# Patient Record
Sex: FEMALE | Race: WHITE | Employment: FULL TIME | URBAN - METROPOLITAN AREA
[De-identification: names, ages, dates, MRNs, and addresses within clinical notes are randomized per-mention and may not be internally consistent; named-entity substitution may affect disease eponyms.]

---

## 2017-01-04 DIAGNOSIS — R00.2 HEART PALPITATIONS: ICD-10-CM

## 2017-01-04 RX ORDER — LISINOPRIL 5 MG/1
TABLET ORAL
Qty: 30 TABLET | Refills: 10 | Status: SHIPPED | OUTPATIENT
Start: 2017-01-04 | End: 2017-09-11 | Stop reason: ALTCHOICE

## 2017-01-13 ENCOUNTER — TELEPHONE (OUTPATIENT)
Dept: FAMILY MEDICINE CLINIC | Age: 22
End: 2017-01-13

## 2017-01-13 ENCOUNTER — OFFICE VISIT (OUTPATIENT)
Dept: FAMILY MEDICINE CLINIC | Age: 22
End: 2017-01-13

## 2017-01-13 VITALS
BODY MASS INDEX: 19.25 KG/M2 | WEIGHT: 119.8 LBS | SYSTOLIC BLOOD PRESSURE: 136 MMHG | DIASTOLIC BLOOD PRESSURE: 82 MMHG | HEART RATE: 102 BPM | HEIGHT: 66 IN | OXYGEN SATURATION: 100 %

## 2017-01-13 DIAGNOSIS — I47.1 PAROXYSMAL SUPRAVENTRICULAR TACHYCARDIA (HCC): Primary | ICD-10-CM

## 2017-01-13 DIAGNOSIS — I47.1 SVT (SUPRAVENTRICULAR TACHYCARDIA) (HCC): Primary | ICD-10-CM

## 2017-01-13 LAB
A/G RATIO: 1.6 (ref 1.1–2.2)
ALBUMIN SERPL-MCNC: 4.6 G/DL (ref 3.4–5)
ALP BLD-CCNC: 59 U/L (ref 40–129)
ALT SERPL-CCNC: 13 U/L (ref 10–40)
ANION GAP SERPL CALCULATED.3IONS-SCNC: 11 MMOL/L (ref 3–16)
AST SERPL-CCNC: 18 U/L (ref 15–37)
BILIRUB SERPL-MCNC: 0.6 MG/DL (ref 0–1)
BUN BLDV-MCNC: 13 MG/DL (ref 7–20)
CALCIUM SERPL-MCNC: 9.6 MG/DL (ref 8.3–10.6)
CHLORIDE BLD-SCNC: 101 MMOL/L (ref 99–110)
CO2: 27 MMOL/L (ref 21–32)
CREAT SERPL-MCNC: 0.5 MG/DL (ref 0.6–1.1)
GFR AFRICAN AMERICAN: >60
GFR NON-AFRICAN AMERICAN: >60
GLOBULIN: 2.8 G/DL
GLUCOSE BLD-MCNC: 85 MG/DL (ref 70–99)
HCT VFR BLD CALC: 42.1 % (ref 36–48)
HEMOGLOBIN: 14.1 G/DL (ref 12–16)
MCH RBC QN AUTO: 30.9 PG (ref 26–34)
MCHC RBC AUTO-ENTMCNC: 33.4 G/DL (ref 31–36)
MCV RBC AUTO: 92.3 FL (ref 80–100)
PDW BLD-RTO: 12.8 % (ref 12.4–15.4)
PLATELET # BLD: 198 K/UL (ref 135–450)
PMV BLD AUTO: 9.9 FL (ref 5–10.5)
POTASSIUM SERPL-SCNC: 4.2 MMOL/L (ref 3.5–5.1)
RBC # BLD: 4.56 M/UL (ref 4–5.2)
SODIUM BLD-SCNC: 139 MMOL/L (ref 136–145)
TOTAL PROTEIN: 7.4 G/DL (ref 6.4–8.2)
TSH SERPL DL<=0.05 MIU/L-ACNC: 0.86 UIU/ML (ref 0.27–4.2)
WBC # BLD: 4.1 K/UL (ref 4–11)

## 2017-01-13 PROCEDURE — 36415 COLL VENOUS BLD VENIPUNCTURE: CPT | Performed by: FAMILY MEDICINE

## 2017-01-13 PROCEDURE — 99214 OFFICE O/P EST MOD 30 MIN: CPT | Performed by: FAMILY MEDICINE

## 2017-01-13 ASSESSMENT — ENCOUNTER SYMPTOMS
GASTROINTESTINAL NEGATIVE: 1
RESPIRATORY NEGATIVE: 1

## 2017-05-18 ENCOUNTER — OFFICE VISIT (OUTPATIENT)
Dept: FAMILY MEDICINE CLINIC | Age: 22
End: 2017-05-18

## 2017-05-18 VITALS
DIASTOLIC BLOOD PRESSURE: 70 MMHG | HEART RATE: 98 BPM | WEIGHT: 122 LBS | HEIGHT: 66 IN | OXYGEN SATURATION: 100 % | BODY MASS INDEX: 19.61 KG/M2 | SYSTOLIC BLOOD PRESSURE: 124 MMHG

## 2017-05-18 DIAGNOSIS — F41.9 ANXIETY: ICD-10-CM

## 2017-05-18 PROCEDURE — 99213 OFFICE O/P EST LOW 20 MIN: CPT | Performed by: FAMILY MEDICINE

## 2017-05-18 RX ORDER — LORAZEPAM 0.5 MG/1
0.5 TABLET ORAL 2 TIMES DAILY PRN
Qty: 60 TABLET | Refills: 2 | Status: SHIPPED | OUTPATIENT
Start: 2017-05-18 | End: 2017-12-09 | Stop reason: SDUPTHER

## 2017-05-18 ASSESSMENT — ENCOUNTER SYMPTOMS: GASTROINTESTINAL NEGATIVE: 1

## 2017-06-05 ENCOUNTER — OFFICE VISIT (OUTPATIENT)
Dept: FAMILY MEDICINE CLINIC | Age: 22
End: 2017-06-05

## 2017-06-05 VITALS
SYSTOLIC BLOOD PRESSURE: 126 MMHG | WEIGHT: 121.6 LBS | DIASTOLIC BLOOD PRESSURE: 68 MMHG | BODY MASS INDEX: 19.54 KG/M2 | HEIGHT: 66 IN

## 2017-06-05 DIAGNOSIS — R42 DIZZINESS: ICD-10-CM

## 2017-06-05 DIAGNOSIS — R42 VERTIGO: Primary | ICD-10-CM

## 2017-06-05 PROCEDURE — 99213 OFFICE O/P EST LOW 20 MIN: CPT | Performed by: FAMILY MEDICINE

## 2017-06-05 RX ORDER — MECLIZINE HYDROCHLORIDE 25 MG/1
25 TABLET ORAL 3 TIMES DAILY PRN
Qty: 30 TABLET | Refills: 0 | Status: SHIPPED | OUTPATIENT
Start: 2017-06-05 | End: 2017-06-15

## 2017-06-05 ASSESSMENT — ENCOUNTER SYMPTOMS
GASTROINTESTINAL NEGATIVE: 1
RESPIRATORY NEGATIVE: 1

## 2017-06-14 ENCOUNTER — TELEPHONE (OUTPATIENT)
Dept: FAMILY MEDICINE CLINIC | Age: 22
End: 2017-06-14

## 2017-09-11 ENCOUNTER — OFFICE VISIT (OUTPATIENT)
Dept: FAMILY MEDICINE CLINIC | Age: 22
End: 2017-09-11

## 2017-09-11 VITALS
WEIGHT: 121 LBS | HEIGHT: 66 IN | SYSTOLIC BLOOD PRESSURE: 132 MMHG | BODY MASS INDEX: 19.44 KG/M2 | DIASTOLIC BLOOD PRESSURE: 82 MMHG

## 2017-09-11 DIAGNOSIS — R20.2 PARESTHESIA: ICD-10-CM

## 2017-09-11 DIAGNOSIS — F41.1 GAD (GENERALIZED ANXIETY DISORDER): ICD-10-CM

## 2017-09-11 DIAGNOSIS — R53.1 WEAKNESS: Primary | ICD-10-CM

## 2017-09-11 DIAGNOSIS — R26.89 BALANCE DISORDER: ICD-10-CM

## 2017-09-11 LAB
A/G RATIO: 1.3 (ref 1.1–2.2)
ALBUMIN SERPL-MCNC: 4.4 G/DL (ref 3.4–5)
ALP BLD-CCNC: 65 U/L (ref 40–129)
ALT SERPL-CCNC: 12 U/L (ref 10–40)
ANION GAP SERPL CALCULATED.3IONS-SCNC: 14 MMOL/L (ref 3–16)
AST SERPL-CCNC: 13 U/L (ref 15–37)
BILIRUB SERPL-MCNC: 0.6 MG/DL (ref 0–1)
BUN BLDV-MCNC: 12 MG/DL (ref 7–20)
CALCIUM SERPL-MCNC: 9.2 MG/DL (ref 8.3–10.6)
CHLORIDE BLD-SCNC: 102 MMOL/L (ref 99–110)
CO2: 25 MMOL/L (ref 21–32)
CREAT SERPL-MCNC: 0.6 MG/DL (ref 0.6–1.1)
GFR AFRICAN AMERICAN: >60
GFR NON-AFRICAN AMERICAN: >60
GLOBULIN: 3.3 G/DL
GLUCOSE BLD-MCNC: 71 MG/DL (ref 70–99)
HCT VFR BLD CALC: 43 % (ref 36–48)
HEMOGLOBIN: 14.5 G/DL (ref 12–16)
MCH RBC QN AUTO: 31.2 PG (ref 26–34)
MCHC RBC AUTO-ENTMCNC: 33.8 G/DL (ref 31–36)
MCV RBC AUTO: 92.5 FL (ref 80–100)
PDW BLD-RTO: 12.1 % (ref 12.4–15.4)
PLATELET # BLD: 221 K/UL (ref 135–450)
PMV BLD AUTO: 9 FL (ref 5–10.5)
POTASSIUM SERPL-SCNC: 4 MMOL/L (ref 3.5–5.1)
RBC # BLD: 4.65 M/UL (ref 4–5.2)
SEDIMENTATION RATE, ERYTHROCYTE: 18 MM/HR (ref 0–20)
SODIUM BLD-SCNC: 141 MMOL/L (ref 136–145)
TOTAL PROTEIN: 7.7 G/DL (ref 6.4–8.2)
TSH SERPL DL<=0.05 MIU/L-ACNC: 0.67 UIU/ML (ref 0.27–4.2)
VITAMIN B-12: 832 PG/ML (ref 211–911)
WBC # BLD: 4.3 K/UL (ref 4–11)

## 2017-09-11 PROCEDURE — 99214 OFFICE O/P EST MOD 30 MIN: CPT | Performed by: FAMILY MEDICINE

## 2017-09-11 PROCEDURE — 36415 COLL VENOUS BLD VENIPUNCTURE: CPT | Performed by: FAMILY MEDICINE

## 2017-09-11 RX ORDER — FLUCONAZOLE 150 MG/1
150 TABLET ORAL ONCE
Qty: 1 TABLET | Refills: 0 | Status: SHIPPED | OUTPATIENT
Start: 2017-09-11 | End: 2017-09-11

## 2017-09-11 RX ORDER — AMOXICILLIN 500 MG/1
500 CAPSULE ORAL 2 TIMES DAILY
Qty: 20 CAPSULE | Refills: 0 | Status: SHIPPED | OUTPATIENT
Start: 2017-09-11 | End: 2017-09-21

## 2017-09-11 ASSESSMENT — ENCOUNTER SYMPTOMS: RESPIRATORY NEGATIVE: 1

## 2017-10-02 ENCOUNTER — OFFICE VISIT (OUTPATIENT)
Dept: FAMILY MEDICINE CLINIC | Age: 22
End: 2017-10-02

## 2017-10-02 VITALS — WEIGHT: 122 LBS | HEIGHT: 66 IN | BODY MASS INDEX: 19.61 KG/M2

## 2017-10-02 DIAGNOSIS — Z01.419 WELL FEMALE EXAM WITH ROUTINE GYNECOLOGICAL EXAM: Primary | ICD-10-CM

## 2017-10-02 PROCEDURE — 99395 PREV VISIT EST AGE 18-39: CPT | Performed by: FAMILY MEDICINE

## 2017-10-02 ASSESSMENT — PATIENT HEALTH QUESTIONNAIRE - PHQ9
1. LITTLE INTEREST OR PLEASURE IN DOING THINGS: 0
SUM OF ALL RESPONSES TO PHQ QUESTIONS 1-9: 0
SUM OF ALL RESPONSES TO PHQ9 QUESTIONS 1 & 2: 0
2. FEELING DOWN, DEPRESSED OR HOPELESS: 0

## 2017-10-02 ASSESSMENT — ENCOUNTER SYMPTOMS: GASTROINTESTINAL NEGATIVE: 1

## 2017-10-02 NOTE — PROGRESS NOTES
Subjective:      Patient ID: Miriam Barrios is a 25 y.o. female. HPI   Pt here for a well  Gyn exam  No issues   one pap five years ago was normal     Review of Systems   Constitutional: Negative. HENT: Negative. Cardiovascular: Negative. Gastrointestinal: Negative. Genitourinary: Negative. Musculoskeletal: Negative. Skin: Negative. Neurological: Negative. Psychiatric/Behavioral: Negative. YOB: 1995    Date of Visit:  10/2/2017    No Known Allergies    Outpatient Prescriptions Marked as Taking for the 10/2/17 encounter (Office Visit) with Warren Portillo, DO   Medication Sig Dispense Refill    verapamil (CALAN SR) 120 MG extended release tablet Take 120 mg by mouth      LORazepam (ATIVAN) 0.5 MG tablet Take 1 tablet by mouth 2 times daily as needed for Anxiety 60 tablet 2       Vitals:    10/02/17 0854   Weight: 122 lb (55.3 kg)   Height: 5' 5.5\" (1.664 m)     Body mass index is 19.99 kg/(m^2). Wt Readings from Last 3 Encounters:   10/02/17 122 lb (55.3 kg)   09/11/17 121 lb (54.9 kg)   06/05/17 121 lb 9.6 oz (55.2 kg)     BP Readings from Last 3 Encounters:   09/11/17 132/82   06/05/17 126/68   05/18/17 124/70       Objective:   Physical Exam   Constitutional: She is oriented to person, place, and time. She appears well-developed and well-nourished. HENT:   Head: Normocephalic. Right Ear: External ear normal.   Left Ear: External ear normal.   Mouth/Throat: Oropharynx is clear and moist.   Neck: No thyromegaly present. Cardiovascular: Normal rate, regular rhythm and normal heart sounds. Pulmonary/Chest: Effort normal and breath sounds normal.   Abdominal: Soft. Bowel sounds are normal. She exhibits no mass. Genitourinary: Vagina normal and uterus normal.   Genitourinary Comments: bilat breast with no masses or nipple discharge    Musculoskeletal: Normal range of motion. Lymphadenopathy:     She has no cervical adenopathy.    Neurological: She is alert

## 2017-10-02 NOTE — MR AVS SNAPSHOT
After Visit Summary             John Ojeda   10/2/2017 9:00 AM   Office Visit    Description:  Female : 1995   Provider:  Jeremiah Buckley DO   Department:  [facility]              Your Follow-Up and Future Appointments         Below is a list of your follow-up and future appointments. This may not be a complete list as you may have made appointments directly with providers that we are not aware of or your providers may have made some for you. Please call your providers to confirm appointments. It is important to keep your appointments. Please bring your current insurance card, photo ID, co-pay, and all medication bottles to your appointment. If self-pay, payment is expected at the time of service. Your To-Do List     Follow-Up    Return in about 1 year (around 10/2/2018). Information from Your Visit        Department     Name Address Phone Fax    [address] [facility] [address] [facility] [address]  [address] [place] [address] [phone]      You Were Seen for:         Comments    Well female exam with routine gynecological exam   [[id number]]         Vital Signs     Height Weight Last Menstrual Period Body Mass Index Smoking Status       5' 5.5\" (1.664 m) 122 lb (55.3 kg) 2017 19.99 kg/m2 Never Smoker          Medications and Orders      Your Current Medications Are              verapamil (CALAN SR) 120 MG extended release tablet Take 120 mg by mouth    LORazepam (ATIVAN) 0.5 MG tablet Take 1 tablet by mouth 2 times daily as needed for Anxiety      Allergies           No Known Allergies      We Ordered/Performed the following           PAP SMEAR     Comments:    Patient History:    Patient's last menstrual period was 2017. OBGYN Status: Having regular periods  No past surgical history on file.       Smoking status: Never Smoker                                                              Smokeless status: Never Used Comment: encouraged to never smoke            Additional Information        Basic Information     Date Of Birth Sex Race Ethnicity Preferred Language    1995 Female White Non-/Non  English      Problem List as of 10/2/2017  Date Reviewed: 9/11/2017          None      Immunizations as of 10/2/2017     Name Date    HPV Gardasil Quadrivalent 5/9/2013, 12/10/2012, 11/9/2012    Tdap (Boostrix, Adacel) 6/9/2014      Preventive Care        Date Due    HIV screening is recommended for all people regardless of risk factors  aged 15-65 years at least once (lifetime) who have never been HIV tested. 4/25/2010    Pap Smear 4/25/2016    Yearly Flu Vaccine (1) 9/1/2017    Tetanus Combination Vaccine (2 - Td) 6/9/2024            MyChart Signup           [facility] allows you to send messages to your doctor, view your test results, renew your prescriptions, schedule appointments, view visit notes, and more. How Do I Sign Up? 1. In your Internet browser, go to [email or web address]  2. Click on the Sign Up Now link in the Sign In box. You will see the New Member Sign Up page. 3. Enter your [facility] Access Code exactly as it appears below. You will not need to use this code after youve completed the sign-up process. If you do not sign up before the expiration date, you must request a new code. [facility] Access Code: [email or web address]  Expires: 11/10/2017  9:49 AM    4. Enter your Social Security Number ([id number]) and Date of Birth (mm/dd/yyyy) as indicated and click Submit. You will be taken to the next sign-up page. 5. Create a [facility] ID. This will be your [facility] login ID and cannot be changed, so think of one that is secure and easy to remember. 6. Create a [facility] password. You can change your password at any time. 7. Enter your Password Reset Question and Answer. This can be used at a later time if you forget your password.

## 2017-12-08 ENCOUNTER — OFFICE VISIT (OUTPATIENT)
Dept: FAMILY MEDICINE CLINIC | Age: 22
End: 2017-12-08

## 2017-12-08 VITALS
WEIGHT: 128.4 LBS | SYSTOLIC BLOOD PRESSURE: 128 MMHG | BODY MASS INDEX: 20.63 KG/M2 | HEIGHT: 66 IN | DIASTOLIC BLOOD PRESSURE: 80 MMHG

## 2017-12-08 DIAGNOSIS — F41.9 ANXIETY: ICD-10-CM

## 2017-12-08 PROCEDURE — G8420 CALC BMI NORM PARAMETERS: HCPCS | Performed by: FAMILY MEDICINE

## 2017-12-08 PROCEDURE — G8427 DOCREV CUR MEDS BY ELIG CLIN: HCPCS | Performed by: FAMILY MEDICINE

## 2017-12-08 PROCEDURE — G8484 FLU IMMUNIZE NO ADMIN: HCPCS | Performed by: FAMILY MEDICINE

## 2017-12-08 PROCEDURE — 1036F TOBACCO NON-USER: CPT | Performed by: FAMILY MEDICINE

## 2017-12-08 PROCEDURE — 99213 OFFICE O/P EST LOW 20 MIN: CPT | Performed by: FAMILY MEDICINE

## 2017-12-08 RX ORDER — LORAZEPAM 0.5 MG/1
0.5 TABLET ORAL 2 TIMES DAILY PRN
Qty: 60 TABLET | Refills: 2 | Status: CANCELLED | OUTPATIENT
Start: 2017-12-08

## 2017-12-08 RX ORDER — NORGESTIMATE AND ETHINYL ESTRADIOL 0.25-0.035
KIT ORAL
Refills: 4 | COMMUNITY
Start: 2017-11-02 | End: 2018-08-31 | Stop reason: ALTCHOICE

## 2017-12-09 ENCOUNTER — TELEPHONE (OUTPATIENT)
Dept: FAMILY MEDICINE CLINIC | Age: 22
End: 2017-12-09

## 2017-12-09 DIAGNOSIS — F41.9 ANXIETY: ICD-10-CM

## 2017-12-09 RX ORDER — LORAZEPAM 0.5 MG/1
0.5 TABLET ORAL 2 TIMES DAILY PRN
Qty: 60 TABLET | Refills: 2 | Status: SHIPPED | OUTPATIENT
Start: 2017-12-09 | End: 2018-08-31 | Stop reason: SDUPTHER

## 2017-12-10 RX ORDER — IBUPROFEN 800 MG/1
800 TABLET ORAL EVERY 8 HOURS PRN
Qty: 60 TABLET | Refills: 3 | Status: SHIPPED | OUTPATIENT
Start: 2017-12-10 | End: 2019-10-29

## 2017-12-10 NOTE — PROGRESS NOTES
Subjective:      Patient ID: Berhane Buenrostro is a 25 y.o. female. Mental Health Problem   The primary symptoms do not include dysphoric mood, delusions, hallucinations, bizarre behavior, disorganized speech or negative symptoms. The current episode started more than 1 month ago. This is a chronic problem. The onset of the illness is precipitated by a stressful event and emotional stress. The degree of incapacity that she is experiencing as a consequence of her illness is mild. Sequelae of the illness include harmed interpersonal relations. Additional symptoms of the illness include insomnia, fatigue, agitation, attention impairment, flight of ideas and distractible. Additional symptoms of the illness do not include anhedonia, hypersomnia, unexpected weight change or psychomotor retardation. She does not admit to suicidal ideas. She does not have a plan to commit suicide. She does not contemplate harming herself. She has not already injured self. She does not contemplate injuring another person. Review of Systems   Constitutional: Positive for fatigue. Negative for unexpected weight change. Psychiatric/Behavioral: Positive for agitation. Negative for dysphoric mood and hallucinations. The patient has insomnia. YOB: 1995    Date of Visit:  12/8/2017    No Known Allergies    Outpatient Prescriptions Marked as Taking for the 12/8/17 encounter (Office Visit) with Lorin Waggoner,    Medication Sig Dispense Refill    MONONESSA 0.25-35 MG-MCG per tablet TK 1 T PO QD  4    verapamil (CALAN SR) 120 MG extended release tablet Take 120 mg by mouth      [DISCONTINUED] LORazepam (ATIVAN) 0.5 MG tablet Take 1 tablet by mouth 2 times daily as needed for Anxiety 60 tablet 2       Vitals:    12/08/17 1345   BP: 128/80   Weight: 128 lb 6.4 oz (58.2 kg)   Height: 5' 5.5\" (1.664 m)     Body mass index is 21.04 kg/m².      Wt Readings from Last 3 Encounters:   12/08/17 128 lb 6.4 oz (58.2 kg)

## 2018-02-12 ENCOUNTER — OFFICE VISIT (OUTPATIENT)
Dept: FAMILY MEDICINE CLINIC | Age: 23
End: 2018-02-12

## 2018-02-12 VITALS
HEIGHT: 67 IN | HEART RATE: 130 BPM | WEIGHT: 131 LBS | BODY MASS INDEX: 20.56 KG/M2 | SYSTOLIC BLOOD PRESSURE: 145 MMHG | DIASTOLIC BLOOD PRESSURE: 97 MMHG

## 2018-02-12 DIAGNOSIS — K21.9 GASTROESOPHAGEAL REFLUX DISEASE, ESOPHAGITIS PRESENCE NOT SPECIFIED: Primary | ICD-10-CM

## 2018-02-12 DIAGNOSIS — R00.0 SINUS TACHYCARDIA: ICD-10-CM

## 2018-02-12 DIAGNOSIS — K29.00 ACUTE GASTRITIS, PRESENCE OF BLEEDING UNSPECIFIED, UNSPECIFIED GASTRITIS TYPE: ICD-10-CM

## 2018-02-12 PROCEDURE — 99214 OFFICE O/P EST MOD 30 MIN: CPT | Performed by: FAMILY MEDICINE

## 2018-02-12 PROCEDURE — G8420 CALC BMI NORM PARAMETERS: HCPCS | Performed by: FAMILY MEDICINE

## 2018-02-12 PROCEDURE — G8484 FLU IMMUNIZE NO ADMIN: HCPCS | Performed by: FAMILY MEDICINE

## 2018-02-12 PROCEDURE — G8427 DOCREV CUR MEDS BY ELIG CLIN: HCPCS | Performed by: FAMILY MEDICINE

## 2018-02-12 PROCEDURE — 1036F TOBACCO NON-USER: CPT | Performed by: FAMILY MEDICINE

## 2018-02-12 RX ORDER — OMEPRAZOLE 40 MG/1
40 CAPSULE, DELAYED RELEASE ORAL DAILY
Qty: 30 CAPSULE | Refills: 3 | Status: CANCELLED | OUTPATIENT
Start: 2018-02-12

## 2018-02-12 RX ORDER — PANTOPRAZOLE SODIUM 20 MG/1
20 TABLET, DELAYED RELEASE ORAL DAILY
Qty: 30 TABLET | Refills: 3 | Status: SHIPPED | OUTPATIENT
Start: 2018-02-12 | End: 2018-08-31 | Stop reason: ALTCHOICE

## 2018-02-12 ASSESSMENT — ENCOUNTER SYMPTOMS
BELCHING: 0
FLATUS: 0
VOMITING: 0
NAUSEA: 1
DIARRHEA: 0
ABDOMINAL PAIN: 1
CONSTIPATION: 0

## 2018-02-12 NOTE — PROGRESS NOTES
Subjective:      Patient ID: Linzie Dandy is a 25 y.o. female. Abdominal Pain   This is a recurrent problem. The current episode started more than 1 year ago. The onset quality is gradual. The problem occurs intermittently. The problem has been waxing and waning. The pain is located in the epigastric region. The pain is at a severity of 5/10. The pain is moderate. The quality of the pain is aching. The abdominal pain radiates to the epigastric region. Associated symptoms include anorexia, myalgias and nausea. Pertinent negatives include no arthralgias, belching, constipation, diarrhea, dysuria, fever, flatus, frequency, hematuria, melena, vomiting or weight loss. The pain is aggravated by belching. The pain is relieved by eating. She has tried antacids for the symptoms. The treatment provided no relief. Tachycardia  She has svt  It is flaring this morning  Pulse in the 130's   She is supposed to have ablation sinus node  Seeing cardiology  Feels light headed   Started driving in here started to feel   Taking her verapamil   Review of Systems   Constitutional: Negative for fever and weight loss. Gastrointestinal: Positive for abdominal pain, anorexia and nausea. Negative for constipation, diarrhea, flatus, melena and vomiting. Genitourinary: Negative for dysuria, frequency and hematuria. Musculoskeletal: Positive for myalgias. Negative for arthralgias. YOB: 1995    Date of Visit:  2/12/2018    No Known Allergies    Outpatient Prescriptions Marked as Taking for the 2/12/18 encounter (Office Visit) with [facility], DO   Medication Sig Dispense Refill    ibuprofen (ADVIL;MOTRIN) 800 MG tablet Take 1 tablet by mouth every 8 hours as needed for Pain 60 tablet 3    LORazepam (ATIVAN) 0.5 MG tablet Take 1 tablet by mouth 2 times daily as needed for Anxiety .  60 tablet 2    MONONESSA 0.25-35 MG-MCG per tablet TK 1 T PO QD  4    verapamil (CALAN SR) 120 MG extended release tablet Take

## 2018-08-31 ENCOUNTER — OFFICE VISIT (OUTPATIENT)
Dept: FAMILY MEDICINE CLINIC | Age: 23
End: 2018-08-31

## 2018-08-31 VITALS
SYSTOLIC BLOOD PRESSURE: 122 MMHG | WEIGHT: 137 LBS | DIASTOLIC BLOOD PRESSURE: 72 MMHG | BODY MASS INDEX: 21.5 KG/M2 | HEIGHT: 67 IN

## 2018-08-31 DIAGNOSIS — F41.9 ANXIETY: ICD-10-CM

## 2018-08-31 DIAGNOSIS — N93.8 DUB (DYSFUNCTIONAL UTERINE BLEEDING): Primary | ICD-10-CM

## 2018-08-31 PROCEDURE — G8420 CALC BMI NORM PARAMETERS: HCPCS | Performed by: FAMILY MEDICINE

## 2018-08-31 PROCEDURE — G8427 DOCREV CUR MEDS BY ELIG CLIN: HCPCS | Performed by: FAMILY MEDICINE

## 2018-08-31 PROCEDURE — 99213 OFFICE O/P EST LOW 20 MIN: CPT | Performed by: FAMILY MEDICINE

## 2018-08-31 PROCEDURE — 1036F TOBACCO NON-USER: CPT | Performed by: FAMILY MEDICINE

## 2018-08-31 RX ORDER — LEVONORGESTREL / ETHINYL ESTRADIOL AND ETHINYL ESTRADIOL 150-30(84)
1 KIT ORAL DAILY
Qty: 91 TABLET | Refills: 3 | Status: SHIPPED | OUTPATIENT
Start: 2018-08-31 | End: 2019-05-07 | Stop reason: ALTCHOICE

## 2018-08-31 RX ORDER — LORAZEPAM 0.5 MG/1
0.5 TABLET ORAL 2 TIMES DAILY PRN
Qty: 60 TABLET | Refills: 2 | Status: SHIPPED | OUTPATIENT
Start: 2018-08-31 | End: 2018-09-30

## 2018-09-03 ASSESSMENT — ENCOUNTER SYMPTOMS
RESPIRATORY NEGATIVE: 1
GASTROINTESTINAL NEGATIVE: 1

## 2018-10-30 RX ORDER — AMOXICILLIN 500 MG/1
500 CAPSULE ORAL 3 TIMES DAILY
Qty: 30 CAPSULE | Refills: 0 | Status: SHIPPED | OUTPATIENT
Start: 2018-10-30 | End: 2018-11-09

## 2019-05-07 ENCOUNTER — OFFICE VISIT (OUTPATIENT)
Dept: FAMILY MEDICINE CLINIC | Age: 24
End: 2019-05-07
Payer: COMMERCIAL

## 2019-05-07 VITALS
DIASTOLIC BLOOD PRESSURE: 74 MMHG | SYSTOLIC BLOOD PRESSURE: 124 MMHG | BODY MASS INDEX: 21 KG/M2 | WEIGHT: 133.8 LBS | HEIGHT: 67 IN

## 2019-05-07 DIAGNOSIS — N93.8 DUB (DYSFUNCTIONAL UTERINE BLEEDING): ICD-10-CM

## 2019-05-07 DIAGNOSIS — F41.1 GAD (GENERALIZED ANXIETY DISORDER): Primary | ICD-10-CM

## 2019-05-07 PROCEDURE — G8427 DOCREV CUR MEDS BY ELIG CLIN: HCPCS | Performed by: FAMILY MEDICINE

## 2019-05-07 PROCEDURE — G8420 CALC BMI NORM PARAMETERS: HCPCS | Performed by: FAMILY MEDICINE

## 2019-05-07 PROCEDURE — 1036F TOBACCO NON-USER: CPT | Performed by: FAMILY MEDICINE

## 2019-05-07 PROCEDURE — 99213 OFFICE O/P EST LOW 20 MIN: CPT | Performed by: FAMILY MEDICINE

## 2019-05-07 RX ORDER — LORAZEPAM 0.5 MG/1
TABLET ORAL
Refills: 0 | COMMUNITY
Start: 2019-02-27 | End: 2019-05-07 | Stop reason: SDUPTHER

## 2019-05-07 RX ORDER — LORAZEPAM 0.5 MG/1
TABLET ORAL
Qty: 60 TABLET | Refills: 2 | Status: SHIPPED | OUTPATIENT
Start: 2019-05-07 | End: 2019-06-07

## 2019-05-07 ASSESSMENT — PATIENT HEALTH QUESTIONNAIRE - PHQ9
1. LITTLE INTEREST OR PLEASURE IN DOING THINGS: 0
2. FEELING DOWN, DEPRESSED OR HOPELESS: 0
SUM OF ALL RESPONSES TO PHQ QUESTIONS 1-9: 0
SUM OF ALL RESPONSES TO PHQ9 QUESTIONS 1 & 2: 0
SUM OF ALL RESPONSES TO PHQ QUESTIONS 1-9: 0

## 2019-05-07 ASSESSMENT — ENCOUNTER SYMPTOMS: RESPIRATORY NEGATIVE: 1

## 2019-05-07 NOTE — PROGRESS NOTES
normal. Judgment and thought content normal.   Nursing note and vitals reviewed. Assessment:     Assessment/plan;  Tino Sahni was seen today for medication check, medication refill and menstrual problem. Diagnoses and all orders for this visit:    NEENA (generalized anxiety disorder)  -     LORazepam (ATIVAN) 0.5 MG tablet; TK 1 T PO BID PRA  -     Drug Panel-PM-HI Res-UR Interp-A    DUB (dysfunctional uterine bleeding)  -     AFL - Frank Angulo MD, Obstetrics, [facility]      Controlled Substances Monitoring:     RX Monitoring 5/7/2019   Attestation The Prescription Monitoring Report for this patient was reviewed today. Chronic Pain Routine Monitoring Possible medication side effects, risk of tolerance/dependence & alternative treatments discussed. ;Potential drug abuse or diversion identified, see note documentation.;Obtaining appropriate analgesic effect of treatment. ;No signs of potential drug abuse or diversion identified: otherwise, see note documentation       Kaushik Ramires DO

## 2019-05-07 NOTE — LETTER
MEDICATION AGREEMENT     Bárbara Andrews  [birth year]      For certain conditions, multiple classes of medications may be used to help better manage your symptoms, and to improve your ability to function at home, work and in social settings. However, these medications do have risks, which will be discussed with you, including addiction and dependency. The following prescribed medications need frequent monitoring and will require you to partner and assist in your healthcare. Medication  Dose, instructions and quantity as indicated on current prescription bottle Diagnosis/Reason(s) for Taking Category   Ativan 0.5mg 1 BID    Anxiety                             Benefits and goals of Controlled Substance Medications: There are two potential goals for your treatment: (1) decreased pain and suffering (2) improved daily life functions. There are many possible treatments for your chronic condition(s), and, in addition to controlled substance medications, we will try alternatives such as physical therapy, yoga, massage, home daily exercise, meditation, relaxation techniques, injections, chiropractic manipulations, surgery, cognitive therapy, hypnosis and many medications that are not habit-forming. Use of controlled substance medications may be helpful, but they are unlikely to resolve all of your symptoms or restore all function. Risks of Controlled Substance Medications:    Opioid pain medications: These medications can lead to problems such as addiction/dependence, sedation, lightheadedness/dizziness, memory issues, falls, constipation, nausea, or vomiting. They may also impair the ability to drive or operate machinery. Additionally, these medications may lower testosterone levels, leading to loss of bone strength, stamina and sex drive.   They may cause problems with breathing, sleep apnea and reduced coughing, which are especially dangerous for patients with lung disease. Overdose or dangerous interactions with alcohol and other medications may occur, leading to death. Hyperalgesia may develop, in which patients receiving opioids for the treatment of pain may actually become more sensitive to certain painful stimuli, and in some cases, experience pain from ordinarily non-painful stimuli. Women between the ages of 14-53 who could become pregnant should carefully weigh the risks and benefits of opioids with their physicians, as these medications increase the risk of pregnancy complications, including miscarriage,  delivery and stillbirth. It is also possible for babies to be born addicted to opioids. Opioid dependence withdrawal symptoms may include; feelings of uneasiness, increased pain, irritability, belly pain, diarrhea, sweats and goose-flesh. Benzodiazepines and non-benzodiazepine sleep medications: These medications can lead to problems such as addiction/dependence, sedation, fatigue, lightheadedness, dizziness, incoordination, falls, depression, hallucinations, and impaired judgment, memory and concentration. The ability to drive and operate machinery may also be affected. Abnormal sleep-related behaviors have been reported, including sleep walking, driving, making telephone calls, eating, or having sex while not fully awake. These medications can suppress breathing and worsen sleep apnea, particularly when combined with alcohol or other sedating medications, potentially leading to death. Dependence withdrawal symptoms may include tremors, anxiety, hallucinations and seizures. Stimulants:  Common adverse effects include addiction/dependence, increased blood pressure and heart rate, decreased appetite, nausea, involuntary weight loss, insomnia, irritability, and headaches.   These risks may increase when these medications are combined with other stimulants, such as caffeine pills or energy drinks, certain weight loss · I will actively participate in any program designed to improve function, including social, physical, psychological and daily or work activities. 2. I will not use illegal or street drugs or another person's prescription. If I have an addiction problem with drugs or alcohol and my provider asks me to enter a program to address this issue, I agree to follow through. Such programs may include:  · 12-Step program and securing a sponsor  · Individual counseling   · Inpatient or outpatient treatment  · Other:_____________________________________________________________________________________________________________________________________________    If in treatment, I will request that a copy of the programs initial evaluation and treatment recommendations be sent to this provider and will not expect refills until that is received. I will also request written monthly updates be sent to this provider to verify my continuing treatment. 3. I will consent to drug screening upon my providers request to assure I am only taking the prescribed drugs, described in this MEDICATION AGREEMENT. I understand that a drug screen is a laboratory test in which a sample of my urine, blood or saliva is checked to see what drugs I have been taking. 4. I agree that I will treat the providers and staff at this office with respect at all times. I will keep all of my scheduled appointments, but if I need to cancel my appointment, I will do so a minimum of 24 hours before it is scheduled. 5. I understand that this provider may stop prescribing the medications listed if:  · I do not show any improvement in pain, or my activity has not improved. · I develop rapid tolerance or loss of improvement, as described in my treatment plan. · I develop significant side effects from the medication.   · My behavior is inconsistent with the responsibilities outlined above, which may also result in my being prevented from receiving further care from this office. · Other:____________________________________________________________________    AGREEMENT:    I have read the above and have had all of my questions answered. For chronic disease management, I know that my symptoms can be managed with many types of treatments. A chronic medication trial may be part of my treatment, but I must be an active participant in my care. Medication therapy is only one part of my symptom management plan. In some cases, there may be limited scientific evidence to support the chronic use of certain medications to improve symptoms and daily function. Furthermore, in certain circumstances, there may be scientific information that suggests that use of chronic controlled substances may actually worsen my symptoms and increase my risk of unintentional death directly related to this medication therapy. I know that if my provider feels my risk from controlled medications is greater than my benefit, I will have my controlled substance medication(s) compassionately lowered or removed altogether. I agree to a controlled substance medication trial.      I further agree to allow this office to contact my HIPAA contact on file if there are concerns about my safety and use of controlled medications. I have agreed to use the following medications above as instructed by my physician and as stated in this [facility].      Patient Signature:  ______________________  Date:5/7/2019 or _____________    Provider Signature:______________________  Date:5/7/2019 or _____________

## 2019-05-11 LAB
6-ACETYLMORPHINE: NOT DETECTED
7-AMINOCLONAZEPAM: NOT DETECTED
ALPHA-OH-ALPRAZOLAM: NOT DETECTED
ALPRAZOLAM: NOT DETECTED
AMPHETAMINE: NOT DETECTED
BARBITURATES: NOT DETECTED
BENZOYLECGONINE: NOT DETECTED
BUPRENORPHINE: NOT DETECTED
CARISOPRODOL: NOT DETECTED
CLONAZEPAM: NOT DETECTED
CODEINE: NOT DETECTED
CREATININE URINE: 168.4 MG/DL (ref 20–400)
DIAZEPAM: NOT DETECTED
DRUGS EXPECTED: NORMAL
EER PAIN MGT DRUG PANEL, HIGH RES/EMIT U: NORMAL
ETHYL GLUCURONIDE: NOT DETECTED
FENTANYL: NOT DETECTED
HYDROCODONE: NOT DETECTED
HYDROMORPHONE: NOT DETECTED
LORAZEPAM: PRESENT
MARIJUANA METABOLITE: PRESENT
MDA: NOT DETECTED
MDEA: NOT DETECTED
MDMA URINE: NOT DETECTED
MEPERIDINE: NOT DETECTED
METHADONE: NOT DETECTED
METHAMPHETAMINE: NOT DETECTED
METHYLPHENIDATE: NOT DETECTED
MIDAZOLAM: NOT DETECTED
MORPHINE: NOT DETECTED
NORBUPRENORPHINE, FREE: NOT DETECTED
NORDIAZEPAM: NOT DETECTED
NORFENTANYL: NOT DETECTED
NORHYDROCODONE, URINE: NOT DETECTED
NOROXYCODONE: NOT DETECTED
NOROXYMORPHONE, URINE: NOT DETECTED
OXAZEPAM: NOT DETECTED
OXYCODONE: NOT DETECTED
OXYMORPHONE: NOT DETECTED
PAIN MANAGEMENT DRUG PANEL: NORMAL
PAIN MANAGEMENT DRUG PANEL: NORMAL
PCP: NOT DETECTED
PHENTERMINE: NOT DETECTED
PROPOXYPHENE: NOT DETECTED
TAPENTADOL, URINE: NOT DETECTED
TAPENTADOL-O-SULFATE, URINE: NOT DETECTED
TEMAZEPAM: NOT DETECTED
TRAMADOL: NOT DETECTED
ZOLPIDEM: NOT DETECTED

## 2019-05-13 ENCOUNTER — OFFICE VISIT (OUTPATIENT)
Dept: FAMILY MEDICINE CLINIC | Age: 24
End: 2019-05-13
Payer: COMMERCIAL

## 2019-05-13 ENCOUNTER — TELEPHONE (OUTPATIENT)
Dept: FAMILY MEDICINE CLINIC | Age: 24
End: 2019-05-13

## 2019-05-13 VITALS
HEIGHT: 67 IN | SYSTOLIC BLOOD PRESSURE: 140 MMHG | DIASTOLIC BLOOD PRESSURE: 86 MMHG | BODY MASS INDEX: 20.91 KG/M2 | WEIGHT: 133.2 LBS

## 2019-05-13 DIAGNOSIS — J01.90 ACUTE BACTERIAL SINUSITIS: Primary | ICD-10-CM

## 2019-05-13 DIAGNOSIS — B96.89 ACUTE BACTERIAL SINUSITIS: Primary | ICD-10-CM

## 2019-05-13 LAB
INFLUENZA A ANTIGEN, POC: NEGATIVE
INFLUENZA B ANTIGEN, POC: NEGATIVE

## 2019-05-13 PROCEDURE — 87804 INFLUENZA ASSAY W/OPTIC: CPT | Performed by: FAMILY MEDICINE

## 2019-05-13 PROCEDURE — G8420 CALC BMI NORM PARAMETERS: HCPCS | Performed by: FAMILY MEDICINE

## 2019-05-13 PROCEDURE — 99213 OFFICE O/P EST LOW 20 MIN: CPT | Performed by: FAMILY MEDICINE

## 2019-05-13 PROCEDURE — 1036F TOBACCO NON-USER: CPT | Performed by: FAMILY MEDICINE

## 2019-05-13 PROCEDURE — G8427 DOCREV CUR MEDS BY ELIG CLIN: HCPCS | Performed by: FAMILY MEDICINE

## 2019-05-13 RX ORDER — AZITHROMYCIN 250 MG/1
250 TABLET, FILM COATED ORAL SEE ADMIN INSTRUCTIONS
Qty: 6 TABLET | Refills: 0 | Status: SHIPPED | OUTPATIENT
Start: 2019-05-13 | End: 2019-05-18

## 2019-05-13 ASSESSMENT — ENCOUNTER SYMPTOMS
HOARSE VOICE: 1
COUGH: 1
SINUS PRESSURE: 1

## 2019-05-13 NOTE — PROGRESS NOTES
Subjective:      Patient ID: Perez Fischer is a 25 y.o. female. Sinusitis   This is a new problem. The current episode started in the past 7 days. The problem has been gradually worsening since onset. There has been no fever. The pain is moderate. Associated symptoms include chills, congestion, coughing, headaches, a hoarse voice, sinus pressure and sneezing. Past treatments include oral decongestants and acetaminophen. The treatment provided no relief. Review of Systems   Constitutional: Positive for chills. HENT: Positive for congestion, hoarse voice, sinus pressure and sneezing. Respiratory: Positive for cough. Neurological: Positive for headaches. YOB: 1995    Date of Visit:  5/13/2019    Allergies   Allergen Reactions    Prochlorperazine Anxiety       Outpatient Medications Marked as Taking for the 5/13/19 encounter (Office Visit) with Jim Camacho, DO   Medication Sig Dispense Refill    LORazepam (ATIVAN) 0.5 MG tablet TK 1 T PO BID PRA 60 tablet 2    ibuprofen (ADVIL;MOTRIN) 800 MG tablet Take 1 tablet by mouth every 8 hours as needed for Pain 60 tablet 3    verapamil (CALAN SR) 120 MG extended release tablet Take 120 mg by mouth         Vitals:    05/13/19 1358   BP: (!) 140/86   Weight: 133 lb 3.2 oz (60.4 kg)   Height: 5' 7\" (1.702 m)     Body mass index is 20.86 kg/m². Wt Readings from Last 3 Encounters:   05/13/19 133 lb 3.2 oz (60.4 kg)   05/07/19 133 lb 12.8 oz (60.7 kg)   08/31/18 137 lb (62.1 kg)     BP Readings from Last 3 Encounters:   05/13/19 (!) 140/86   05/07/19 124/74   08/31/18 122/72       Objective:   Physical Exam   Constitutional: She is oriented to person, place, and time. She appears well-developed and well-nourished. No distress. HENT:   Head: Normocephalic. Right Ear: Tympanic membrane, external ear and ear canal normal.   Left Ear: Tympanic membrane, external ear and ear canal normal.   Nose: Mucosal edema present.    Mouth/Throat: Posterior oropharyngeal erythema present. Eyes: Conjunctivae are normal. Left eye discharge: pnd    Neck: No thyromegaly present. Cardiovascular: Normal rate. Pulmonary/Chest: Effort normal and breath sounds normal. No respiratory distress. She has no wheezes. She has no rales. Lymphadenopathy:     She has cervical adenopathy. Neurological: She is alert and oriented to person, place, and time. Skin: Skin is warm and dry. No rash noted. Psychiatric: She has a normal mood and affect. Her behavior is normal. Judgment and thought content normal.       Assessment:      Assessment/plan;  Chance Tubbs was seen today for congestion, otalgia and nausea. Diagnoses and all orders for this visit:    Acute bacterial sinusitis  -     POCT Influenza A/B Antigen (BD Veritor)    Other orders  -     azithromycin (ZITHROMAX) 250 MG tablet; Take 1 tablet by mouth See Admin Instructions for 5 days 500mg on day 1 followed by 250mg on days 2 - 5      Return if symptoms worsen or fail to improve.     Keshia Bradford, DO

## 2019-05-24 ENCOUNTER — OFFICE VISIT (OUTPATIENT)
Dept: FAMILY MEDICINE CLINIC | Age: 24
End: 2019-05-24
Payer: COMMERCIAL

## 2019-05-24 VITALS — BODY MASS INDEX: 20.86 KG/M2 | HEIGHT: 67 IN

## 2019-05-24 DIAGNOSIS — Z32.00 POSSIBLE PREGNANCY: Primary | ICD-10-CM

## 2019-05-24 LAB — HCG QUALITATIVE: POSITIVE

## 2019-05-24 PROCEDURE — 99999 PR OFFICE/OUTPT VISIT,PROCEDURE ONLY: CPT | Performed by: FAMILY MEDICINE

## 2019-05-24 PROCEDURE — 36415 COLL VENOUS BLD VENIPUNCTURE: CPT | Performed by: FAMILY MEDICINE

## 2019-05-24 NOTE — PROGRESS NOTES
Pt presents for bw for possible pregnancy. She has had 3 positive at home testing and wants just bloodwork  She has discuss medication changes with cardiologist and discuss ativan with Dr Gennett Meckel. She already has appt scheduled for obgyn.

## 2019-09-16 ENCOUNTER — TELEPHONE (OUTPATIENT)
Dept: FAMILY MEDICINE CLINIC | Age: 24
End: 2019-09-16

## 2019-09-17 ENCOUNTER — TELEPHONE (OUTPATIENT)
Dept: FAMILY MEDICINE CLINIC | Age: 24
End: 2019-09-17

## 2019-09-17 ENCOUNTER — OFFICE VISIT (OUTPATIENT)
Dept: FAMILY MEDICINE CLINIC | Age: 24
End: 2019-09-17
Payer: COMMERCIAL

## 2019-09-17 VITALS
HEART RATE: 86 BPM | BODY MASS INDEX: 20.09 KG/M2 | DIASTOLIC BLOOD PRESSURE: 72 MMHG | SYSTOLIC BLOOD PRESSURE: 114 MMHG | WEIGHT: 128 LBS | TEMPERATURE: 98.8 F | HEIGHT: 67 IN | OXYGEN SATURATION: 98 %

## 2019-09-17 DIAGNOSIS — J40 BRONCHITIS: Primary | ICD-10-CM

## 2019-09-17 PROCEDURE — G8427 DOCREV CUR MEDS BY ELIG CLIN: HCPCS | Performed by: FAMILY MEDICINE

## 2019-09-17 PROCEDURE — G8420 CALC BMI NORM PARAMETERS: HCPCS | Performed by: FAMILY MEDICINE

## 2019-09-17 PROCEDURE — 1036F TOBACCO NON-USER: CPT | Performed by: FAMILY MEDICINE

## 2019-09-17 PROCEDURE — 99213 OFFICE O/P EST LOW 20 MIN: CPT | Performed by: FAMILY MEDICINE

## 2019-09-17 RX ORDER — AZITHROMYCIN 1 G
1 PACKET (EA) ORAL ONCE
COMMUNITY
End: 2019-10-29

## 2019-09-17 RX ORDER — DOXYCYCLINE HYCLATE 100 MG
100 TABLET ORAL 2 TIMES DAILY
Qty: 20 TABLET | Refills: 0 | Status: SHIPPED | OUTPATIENT
Start: 2019-09-17 | End: 2019-09-27

## 2019-09-17 RX ORDER — DEXTROMETHORPHAN HYDROBROMIDE AND PROMETHAZINE HYDROCHLORIDE 15; 6.25 MG/5ML; MG/5ML
5 SYRUP ORAL 4 TIMES DAILY PRN
Qty: 1 BOTTLE | Refills: 2 | Status: SHIPPED | OUTPATIENT
Start: 2019-09-17 | End: 2019-10-17

## 2019-09-17 RX ORDER — LORAZEPAM 0.5 MG/1
0.5 TABLET ORAL EVERY 6 HOURS PRN
COMMUNITY
End: 2020-03-24 | Stop reason: SDUPTHER

## 2019-09-17 ASSESSMENT — ENCOUNTER SYMPTOMS
COUGH: 1
RHINORRHEA: 1
SHORTNESS OF BREATH: 1
WHEEZING: 1

## 2019-09-17 NOTE — PROGRESS NOTES
Subjective:      Patient ID: Jae Bass is a 25 y.o. female. Cough   This is a new problem. The current episode started 1 to 4 weeks ago. The problem has been gradually worsening. The problem occurs every few minutes. The cough is productive of sputum. Associated symptoms include chills, headaches, myalgias, nasal congestion, postnasal drip, rhinorrhea, shortness of breath and wheezing. The symptoms are aggravated by exercise and lying down. She has tried OTC cough suppressant and rest for the symptoms. The treatment provided no relief. Her past medical history is significant for bronchitis and environmental allergies. Review of Systems   Constitutional: Positive for chills. HENT: Positive for postnasal drip and rhinorrhea. Respiratory: Positive for cough, shortness of breath and wheezing. Musculoskeletal: Positive for myalgias. Allergic/Immunologic: Positive for environmental allergies. Neurological: Positive for headaches. YOB: 1995    Date of Visit:  9/17/2019    Allergies   Allergen Reactions    Prochlorperazine Anxiety       Outpatient Medications Marked as Taking for the 9/17/19 encounter (Office Visit) with Michelle Rowe, DO   Medication Sig Dispense Refill    LORazepam (ATIVAN) 0.5 MG tablet Take 0.5 mg by mouth every 6 hours as needed for Anxiety.  norelgestromin-ethinyl estradiol Winda Lever) 150-35 MCG/24HR Place 1 patch onto the skin once a week      azithromycin (ZITHROMAX) 1 g powder Take 1 packet by mouth once      doxycycline hyclate (VIBRA-TABS) 100 MG tablet Take 1 tablet by mouth 2 times daily for 10 days 20 tablet 0    promethazine-dextromethorphan (PROMETHAZINE-DM) 6.25-15 MG/5ML syrup Take 5 mLs by mouth 4 times daily as needed for Cough 1 Bottle 2       Vitals:    09/17/19 0945   BP: 114/72   Pulse: 86   Temp: 98.8 °F (37.1 °C)   SpO2: 98%   Weight: 128 lb (58.1 kg)   Height: 5' 7\" (1.702 m)     Body mass index is 20.05 kg/m².      Wt Readings

## 2019-09-17 NOTE — TELEPHONE ENCOUNTER
Patients script for promethizine is written for one bottle. [facility] needs clarification on that medication. Please call back.

## 2019-10-29 ENCOUNTER — OFFICE VISIT (OUTPATIENT)
Dept: FAMILY MEDICINE CLINIC | Age: 24
End: 2019-10-29
Payer: COMMERCIAL

## 2019-10-29 VITALS
BODY MASS INDEX: 20.52 KG/M2 | HEART RATE: 76 BPM | DIASTOLIC BLOOD PRESSURE: 74 MMHG | OXYGEN SATURATION: 99 % | SYSTOLIC BLOOD PRESSURE: 118 MMHG | WEIGHT: 131 LBS | TEMPERATURE: 97.8 F

## 2019-10-29 DIAGNOSIS — Z01.419 WELL FEMALE EXAM WITH ROUTINE GYNECOLOGICAL EXAM: Primary | ICD-10-CM

## 2019-10-29 DIAGNOSIS — N89.8 VAGINAL DISCHARGE: ICD-10-CM

## 2019-10-29 PROBLEM — R00.0 TACHYCARDIA: Status: ACTIVE | Noted: 2019-05-28

## 2019-10-29 PROBLEM — I47.11 INAPPROPRIATE SINUS NODE TACHYCARDIA: Status: ACTIVE | Noted: 2017-01-23

## 2019-10-29 PROBLEM — R00.0 INAPPROPRIATE SINUS NODE TACHYCARDIA: Status: ACTIVE | Noted: 2017-01-23

## 2019-10-29 PROBLEM — I10 BENIGN ESSENTIAL HYPERTENSION: Status: ACTIVE | Noted: 2017-03-13

## 2019-10-29 PROCEDURE — G8484 FLU IMMUNIZE NO ADMIN: HCPCS | Performed by: FAMILY MEDICINE

## 2019-10-29 PROCEDURE — 99395 PREV VISIT EST AGE 18-39: CPT | Performed by: FAMILY MEDICINE

## 2019-10-29 ASSESSMENT — ENCOUNTER SYMPTOMS
GASTROINTESTINAL NEGATIVE: 1
RESPIRATORY NEGATIVE: 1

## 2019-10-30 LAB
CANDIDA SPECIES, DNA PROBE: NORMAL
GARDNERELLA VAGINALIS, DNA PROBE: NORMAL
TRICHOMONAS VAGINALIS DNA: NORMAL

## 2019-11-01 LAB
C TRACH DNA GENITAL QL NAA+PROBE: NEGATIVE
HPV COMMENT: NORMAL
HPV TYPE 16: NOT DETECTED
HPV TYPE 18: NOT DETECTED
HPVOH (OTHER TYPES): NOT DETECTED
N. GONORRHOEAE DNA: NEGATIVE

## 2019-12-03 ENCOUNTER — PATIENT MESSAGE (OUTPATIENT)
Dept: FAMILY MEDICINE CLINIC | Age: 24
End: 2019-12-03

## 2020-02-03 ENCOUNTER — OFFICE VISIT (OUTPATIENT)
Dept: FAMILY MEDICINE CLINIC | Age: 25
End: 2020-02-03
Payer: COMMERCIAL

## 2020-02-03 VITALS
BODY MASS INDEX: 20.65 KG/M2 | WEIGHT: 131.6 LBS | RESPIRATION RATE: 18 BRPM | SYSTOLIC BLOOD PRESSURE: 129 MMHG | TEMPERATURE: 98.7 F | DIASTOLIC BLOOD PRESSURE: 80 MMHG | HEART RATE: 98 BPM | HEIGHT: 67 IN

## 2020-02-03 PROBLEM — J03.90 TONSILLITIS: Status: ACTIVE | Noted: 2020-02-03

## 2020-02-03 PROCEDURE — G8420 CALC BMI NORM PARAMETERS: HCPCS | Performed by: FAMILY MEDICINE

## 2020-02-03 PROCEDURE — G8427 DOCREV CUR MEDS BY ELIG CLIN: HCPCS | Performed by: FAMILY MEDICINE

## 2020-02-03 PROCEDURE — 1036F TOBACCO NON-USER: CPT | Performed by: FAMILY MEDICINE

## 2020-02-03 PROCEDURE — G8484 FLU IMMUNIZE NO ADMIN: HCPCS | Performed by: FAMILY MEDICINE

## 2020-02-03 PROCEDURE — 99213 OFFICE O/P EST LOW 20 MIN: CPT | Performed by: FAMILY MEDICINE

## 2020-02-03 RX ORDER — AMOXICILLIN 500 MG/1
500 CAPSULE ORAL 3 TIMES DAILY
Qty: 21 CAPSULE | Refills: 0 | Status: SHIPPED | OUTPATIENT
Start: 2020-02-03 | End: 2020-02-18 | Stop reason: SDUPTHER

## 2020-02-03 ASSESSMENT — ENCOUNTER SYMPTOMS
COUGH: 1
SINUS PRESSURE: 1
RHINORRHEA: 1

## 2020-02-03 ASSESSMENT — PATIENT HEALTH QUESTIONNAIRE - PHQ9
SUM OF ALL RESPONSES TO PHQ QUESTIONS 1-9: 0
SUM OF ALL RESPONSES TO PHQ9 QUESTIONS 1 & 2: 0
2. FEELING DOWN, DEPRESSED OR HOPELESS: 0
1. LITTLE INTEREST OR PLEASURE IN DOING THINGS: 0
SUM OF ALL RESPONSES TO PHQ QUESTIONS 1-9: 0

## 2020-02-03 NOTE — PROGRESS NOTES
Subjective:      Patient ID: Kvng Huhges is a 25 y.o. female. Pharyngitis   This is a new problem. The current episode started in the past 7 days. The problem occurs constantly. The problem has been gradually worsening. Associated symptoms include anorexia, congestion and coughing. The symptoms are aggravated by eating and drinking. She has tried acetaminophen for the symptoms. The treatment provided mild relief. Review of Systems   Constitutional: Positive for activity change. HENT: Positive for congestion, rhinorrhea and sinus pressure. Respiratory: Positive for cough. Gastrointestinal: Positive for anorexia. YOB: 1995    Date of Visit:  2/3/2020    Allergies   Allergen Reactions    Prochlorperazine Anxiety       Outpatient Medications Marked as Taking for the 2/3/20 encounter (Office Visit) with Joanna Lemon, DO   Medication Sig Dispense Refill    norelgestromin-ethinyl estradiol Libertad Glatter) 150-35 MCG/24HR Place 1 patch onto the skin once a week 3 patch 2    LORazepam (ATIVAN) 0.5 MG tablet Take 0.5 mg by mouth every 6 hours as needed for Anxiety. Vitals:    02/03/20 0850   BP: 129/80   Pulse: 98   Resp: 18   Temp: 98.7 °F (37.1 °C)   Weight: 131 lb 9.6 oz (59.7 kg)   Height: 5' 7\" (1.702 m)     Body mass index is 20.61 kg/m². Wt Readings from Last 3 Encounters:   02/03/20 131 lb 9.6 oz (59.7 kg)   10/29/19 131 lb (59.4 kg)   09/17/19 128 lb (58.1 kg)     BP Readings from Last 3 Encounters:   02/03/20 129/80   10/29/19 118/74   09/17/19 114/72       Objective:   Physical Exam  Constitutional:       General: She is not in acute distress. Appearance: She is well-developed. HENT:      Head: Normocephalic. Right Ear: Tympanic membrane, ear canal and external ear normal.      Left Ear: Tympanic membrane, ear canal and external ear normal.      Nose: Mucosal edema present. Mouth/Throat:      Pharynx: Posterior oropharyngeal erythema present.

## 2020-02-18 ENCOUNTER — OFFICE VISIT (OUTPATIENT)
Dept: FAMILY MEDICINE CLINIC | Age: 25
End: 2020-02-18
Payer: COMMERCIAL

## 2020-02-18 ENCOUNTER — TELEPHONE (OUTPATIENT)
Dept: FAMILY MEDICINE CLINIC | Age: 25
End: 2020-02-18

## 2020-02-18 VITALS
SYSTOLIC BLOOD PRESSURE: 128 MMHG | DIASTOLIC BLOOD PRESSURE: 82 MMHG | TEMPERATURE: 98.1 F | HEIGHT: 67 IN | BODY MASS INDEX: 20.88 KG/M2 | WEIGHT: 133 LBS

## 2020-02-18 PROCEDURE — 99213 OFFICE O/P EST LOW 20 MIN: CPT | Performed by: FAMILY MEDICINE

## 2020-02-18 PROCEDURE — G8420 CALC BMI NORM PARAMETERS: HCPCS | Performed by: FAMILY MEDICINE

## 2020-02-18 PROCEDURE — 1036F TOBACCO NON-USER: CPT | Performed by: FAMILY MEDICINE

## 2020-02-18 PROCEDURE — G8427 DOCREV CUR MEDS BY ELIG CLIN: HCPCS | Performed by: FAMILY MEDICINE

## 2020-02-18 PROCEDURE — G8484 FLU IMMUNIZE NO ADMIN: HCPCS | Performed by: FAMILY MEDICINE

## 2020-02-18 RX ORDER — AMOXICILLIN 500 MG/1
500 CAPSULE ORAL 3 TIMES DAILY
Qty: 30 CAPSULE | Refills: 0 | Status: SHIPPED | OUTPATIENT
Start: 2020-02-18 | End: 2020-02-18 | Stop reason: SDUPTHER

## 2020-02-18 RX ORDER — AMOXICILLIN 500 MG/1
500 CAPSULE ORAL 3 TIMES DAILY
Qty: 30 CAPSULE | Refills: 0 | Status: SHIPPED | OUTPATIENT
Start: 2020-02-18 | End: 2020-02-28

## 2020-02-18 ASSESSMENT — ENCOUNTER SYMPTOMS
COUGH: 1
SORE THROAT: 1
HOARSE VOICE: 1
SINUS PRESSURE: 1

## 2020-02-18 NOTE — LETTER
[address] [facility] [address] [facility],First Floor [address]  Phone: [phone]  Fax: [phone]    Hill Duvall DO        February 18, 2020     Patient: Alex Oropeza   YOB: 1995   Date of Visit: 2/18/2020       To Whom it May Concern:    Alex Oropeza was seen in my clinic on 2/18/2020. She has a medical excuse from work due to illness 2/18 and 2/19/20. If you have any questions or concerns, please don't hesitate to call.     Sincerely,         Hill Duvall DO

## 2020-02-18 NOTE — PROGRESS NOTES
Subjective:      Patient ID: Valentino Cha is a 25 y.o. female. Sinusitis   This is a new problem. The current episode started in the past 7 days. The problem has been gradually worsening since onset. There has been no fever. Her pain is at a severity of 6/10. The pain is moderate. Associated symptoms include chills, congestion, coughing, headaches, a hoarse voice, sinus pressure, sneezing and a sore throat. Past treatments include acetaminophen and oral decongestants. The treatment provided moderate relief. Review of Systems   Constitutional: Positive for chills. HENT: Positive for congestion, hoarse voice, sinus pressure, sneezing and sore throat. Respiratory: Positive for cough. Neurological: Positive for headaches. YOB: 1995    Date of Visit:  2/18/2020    Allergies   Allergen Reactions    Prochlorperazine Anxiety       Outpatient Medications Marked as Taking for the 2/18/20 encounter (Office Visit) with Edil Angulo, DO   Medication Sig Dispense Refill    norelgestromin-ethinyl estradiol Jenet Curb) 150-35 MCG/24HR Place 1 patch onto the skin once a week 3 patch 2    LORazepam (ATIVAN) 0.5 MG tablet Take 0.5 mg by mouth every 6 hours as needed for Anxiety. Vitals:    02/18/20 1053   BP: 128/82   Temp: 98.1 °F (36.7 °C)   Weight: 133 lb (60.3 kg)   Height: 5' 7\" (1.702 m)     Body mass index is 20.83 kg/m². Wt Readings from Last 3 Encounters:   02/18/20 133 lb (60.3 kg)   02/03/20 131 lb 9.6 oz (59.7 kg)   10/29/19 131 lb (59.4 kg)     BP Readings from Last 3 Encounters:   02/18/20 128/82   02/03/20 129/80   10/29/19 118/74       Objective:   Physical Exam  Constitutional:       General: She is not in acute distress. Appearance: She is well-developed. HENT:      Head: Normocephalic. Right Ear: Tympanic membrane, ear canal and external ear normal.      Left Ear: Tympanic membrane, ear canal and external ear normal.      Nose: Mucosal edema present. Mouth/Throat:      Pharynx: Posterior oropharyngeal erythema present. Eyes:      General:         Left eye: Left eye discharge: pnd      Conjunctiva/sclera: Conjunctivae normal.   Neck:      Thyroid: No thyromegaly. Cardiovascular:      Rate and Rhythm: Normal rate. Pulmonary:      Effort: Pulmonary effort is normal. No respiratory distress. Breath sounds: Normal breath sounds. No wheezing or rales. Lymphadenopathy:      Cervical: Cervical adenopathy present. Skin:     General: Skin is warm and dry. Findings: No rash. Neurological:      Mental Status: She is alert and oriented to person, place, and time. Psychiatric:         Behavior: Behavior normal.         Thought Content: Thought content normal.         Judgment: Judgment normal.         Assessment:     Assessment/plan;  Constanza Lozano was seen today for head congestion, generalized body aches and nasal congestion. Diagnoses and all orders for this visit:    Acute bacterial sinusitis    Body aches  -     POCT Influenza A/B    Other orders  -     amoxicillin (AMOXIL) 500 MG capsule;  Take 1 capsule by mouth 3 times daily for 10 days      Call if increasing symptoms     Catarina Pass, DO

## 2020-02-18 NOTE — TELEPHONE ENCOUNTER
Patient states [facility] can not have her amoxicillan until Thursday.  She would like to have that sent to [facility] in [facility] at [phone]

## 2020-03-22 ENCOUNTER — E-VISIT (OUTPATIENT)
Dept: FAMILY MEDICINE CLINIC | Age: 25
End: 2020-03-22

## 2020-03-24 ENCOUNTER — OFFICE VISIT (OUTPATIENT)
Dept: FAMILY MEDICINE CLINIC | Age: 25
End: 2020-03-24
Payer: COMMERCIAL

## 2020-03-24 VITALS
SYSTOLIC BLOOD PRESSURE: 104 MMHG | HEIGHT: 67 IN | WEIGHT: 134 LBS | BODY MASS INDEX: 21.03 KG/M2 | DIASTOLIC BLOOD PRESSURE: 80 MMHG

## 2020-03-24 PROCEDURE — G8420 CALC BMI NORM PARAMETERS: HCPCS | Performed by: FAMILY MEDICINE

## 2020-03-24 PROCEDURE — 1036F TOBACCO NON-USER: CPT | Performed by: FAMILY MEDICINE

## 2020-03-24 PROCEDURE — G8427 DOCREV CUR MEDS BY ELIG CLIN: HCPCS | Performed by: FAMILY MEDICINE

## 2020-03-24 PROCEDURE — 99213 OFFICE O/P EST LOW 20 MIN: CPT | Performed by: FAMILY MEDICINE

## 2020-03-24 PROCEDURE — G8484 FLU IMMUNIZE NO ADMIN: HCPCS | Performed by: FAMILY MEDICINE

## 2020-03-24 RX ORDER — LORAZEPAM 0.5 MG/1
0.5 TABLET ORAL 2 TIMES DAILY PRN
Qty: 60 TABLET | Refills: 0 | Status: SHIPPED | OUTPATIENT
Start: 2020-03-24 | End: 2020-10-13 | Stop reason: SDUPTHER

## 2020-03-24 ASSESSMENT — ENCOUNTER SYMPTOMS
NAUSEA: 0
VOMITING: 0
SORE THROAT: 0
CONSTIPATION: 0
DIARRHEA: 0
BACK PAIN: 0
ABDOMINAL PAIN: 0

## 2020-03-26 LAB
FINAL REPORT: NORMAL
PRELIMINARY: NORMAL

## 2020-03-27 ENCOUNTER — PATIENT MESSAGE (OUTPATIENT)
Dept: FAMILY MEDICINE CLINIC | Age: 25
End: 2020-03-27

## 2020-03-27 RX ORDER — IBUPROFEN 800 MG/1
800 TABLET ORAL EVERY 8 HOURS PRN
Qty: 60 TABLET | Refills: 3 | Status: SHIPPED | OUTPATIENT
Start: 2020-03-27 | End: 2021-03-16 | Stop reason: SDUPTHER

## 2020-03-27 NOTE — TELEPHONE ENCOUNTER
From: Letha Mireles  To: Sharyle Kale, DO  Sent: 3/27/2020 12:22 PM EDT  Subject: Prescription Question    Hello,    I had a prescription for the 800mg ibuprofen prescribed on 7/16/18 and I just ran out and realized the refill was out a long time ago.  Would I be able to get another prescription sent in?

## 2020-05-16 RX ORDER — NORELGESTROMIN AND ETHINYL ESTRADIOL 150; 35 UG/D; UG/D
PATCH TRANSDERMAL
Qty: 9 PATCH | Refills: 0 | Status: SHIPPED | OUTPATIENT
Start: 2020-05-16 | End: 2020-12-17

## 2020-05-20 ENCOUNTER — TELEMEDICINE (OUTPATIENT)
Dept: FAMILY MEDICINE CLINIC | Age: 25
End: 2020-05-20
Payer: COMMERCIAL

## 2020-05-20 PROBLEM — B01.9 VARICELLA WITHOUT COMPLICATION: Status: ACTIVE | Noted: 2020-05-20

## 2020-05-20 PROCEDURE — 99213 OFFICE O/P EST LOW 20 MIN: CPT | Performed by: FAMILY MEDICINE

## 2020-05-20 PROCEDURE — G8427 DOCREV CUR MEDS BY ELIG CLIN: HCPCS | Performed by: FAMILY MEDICINE

## 2020-05-20 ASSESSMENT — ENCOUNTER SYMPTOMS
RESPIRATORY NEGATIVE: 1
GASTROINTESTINAL NEGATIVE: 1

## 2020-05-20 NOTE — LETTER
[address] [facility] [address] [facility],First Floor [address]  Phone: [phone]  Fax: [phone]    Rosmery Ponce DO        May 20, 2020     Patient: Janneth Solo   YOB: 1995   Date of Visit: 5/20/2020       To Whom it May Concern:    Janneth Solo was seen in my clinic on 5/20/2020. She has Chicken Pox and has a medical excuse from work due to the illness and being contagious 5/14/20 through 5/25/20. To return to work 5/26/20 with no restrictions. If you have any questions or concerns, please don't hesitate to call.     Sincerely,         Rosmery Ponce DO

## 2020-05-20 NOTE — PROGRESS NOTES
2020    TELEHEALTH EVALUATION -- Audio/Visual (During DRXO-21 public health emergency)    HPI:    Leonel Gomez (:  1995) has requested an audio/video evaluation for the following concern(s):    Rash  She started last Thursday with body aches and low grade this went on for a few days and then she started with a rash on    Itchy rash on face, scalp, trunk, and arms  The  Rash has a raised blister like on top  Her scalp is very itchy  Fever gone now     Her dad has shingles right now  Her dad does not know if she had chicken pox or the immunization  Taking some benadryl for itching      Review of Systems   Constitutional: Positive for activity change and fatigue. HENT: Negative. Respiratory: Negative. Cardiovascular: Negative. Gastrointestinal: Negative. Skin: Positive for rash. Neurological: Positive for weakness and headaches. Psychiatric/Behavioral: Positive for decreased concentration. Prior to Visit Medications    Medication Sig Taking? Authorizing Provider   Mark Bermudez 150-35 MCG/24HR APPLY 1 PATCH ONCE A WEEK  Marquez Ferrell DO   ibuprofen (ADVIL;MOTRIN) 800 MG tablet Take 1 tablet by mouth every 8 hours as needed for Pain  Amanda Seay, DO       Social History     Tobacco Use    Smoking status: Never Smoker    Smokeless tobacco: Never Used    Tobacco comment: encouraged to never smoke    Substance Use Topics    Alcohol use: No     Alcohol/week: 0.0 standard drinks    Drug use:  No            PHYSICAL EXAMINATION:  [ INSTRUCTIONS:  \"[x]\" Indicates a positive item  \"[]\" Indicates a negative item  -- DELETE ALL ITEMS NOT EXAMINED]  Vital Signs: (As obtained by patient/caregiver or practitioner observation)    Blood pressure-  Heart rate-    Respiratory rate-    Temperature-  Pulse oximetry-     Constitutional: [x] Appears well-developed and well-nourished [x] No apparent distress      [] Abnormal-   Mental status  [x] Alert and awake  [x] Oriented to

## 2020-06-18 ENCOUNTER — OFFICE VISIT (OUTPATIENT)
Dept: ENT CLINIC | Age: 25
End: 2020-06-18
Payer: COMMERCIAL

## 2020-06-18 VITALS
HEIGHT: 67 IN | DIASTOLIC BLOOD PRESSURE: 88 MMHG | HEART RATE: 85 BPM | TEMPERATURE: 97.7 F | SYSTOLIC BLOOD PRESSURE: 125 MMHG | BODY MASS INDEX: 20.56 KG/M2 | WEIGHT: 131 LBS

## 2020-06-18 PROCEDURE — 99203 OFFICE O/P NEW LOW 30 MIN: CPT | Performed by: OTOLARYNGOLOGY

## 2020-06-18 NOTE — PROGRESS NOTES
[facility]      Patient Name: 45 [address] Record Number:  <[id number]>  Primary Care Physician:  Alvina Watt DO  Date of Consultation: 6/18/2020    Chief Complaint: Right-sided otalgia        HISTORY OF PRESENT ILLNESS  Amanda Barrios is a(n) 22 y.o. female who presents for evaluation of a 3-week history of right-sided ear pain. The patient says she is been having intermittent sharp pain deep in the right ear that radiates down her jaw and right face. She denies any trauma or other things that trigger this. She is not sure exactly what makes it worse. She says that Motrin seems to make it a little better. She is not had this type of pain in the past.  She denies any other ear problems such as hearing loss, drainage or dizziness. She denies history of ear problems. she is never had ear surgery. She denies throat pain at this time. She is not a smoker. She saw a dentist this morning and they did not find anything. She says she does not think that she grinds her teeth at night. Patient Active Problem List   Diagnosis    Unspecified viral infection, in conditions classified elsewhere and of unspecified site    Tachycardia    Sprain of medial collateral ligament of knee    Pain in thoracic spine    Inappropriate sinus node tachycardia    Benign essential hypertension    Acute upper respiratory infection    Tonsillitis    Varicella without complication     No past surgical history on file. No family history on file.   Social History     Socioeconomic History    Marital status: Single     Spouse name: Not on file    Number of children: 0    Years of education: Not on file    Highest education level: Not on file   Occupational History    Not on file   Social Needs    Financial resource strain: Not on file    Food insecurity     Worry: Not on file     Inability: Not on file    Transportation needs     Medical: Not on file

## 2020-09-28 ENCOUNTER — TELEPHONE (OUTPATIENT)
Dept: FAMILY MEDICINE CLINIC | Age: 25
End: 2020-09-28

## 2020-09-28 NOTE — LETTER
NOTIFICATION RETURN TO WORK / SCHOOL    9/28/2020    Ms. Tai Rose  [address] [address]      To Whom It May Concern:    Tai Rose was tested for COVID-19 on 9/29/20. She may return to work to be determined based on test results. I recommend:return without restrictions    If there are questions or concerns, please have the patient contact our office.         Sincerely,      Sreedhar Allen D.O.

## 2020-09-28 NOTE — TELEPHONE ENCOUNTER
----- Message from Kindra Madsen sent at 9/28/2020 10:30 AM EDT -----  Subject: Appointment Request    Reason for Call: Urgent (Patient Request) ED Follow Up Visit    QUESTIONS  Type of Appointment? Established Patient  Reason for appointment request? Available appointments did not meet   patient need  Additional Information for Provider? Patient is wanting a ED follow up. Patient has been having sinus and flu like symptoms and the ED did not   test for COVID-19 but for strep. Patient wants a follow up   but soonest available day is 10/13  ---------------------------------------------------------------------------  --------------  CALL BACK INFO  What is the best way for the office to contact you? OK to leave message on   voicemail   OK to respond with secure message via [facility] portal (only for patients   who have registered [facility] account)  Preferred Call Back Phone Number? [phone]  ---------------------------------------------------------------------------  --------------  SCRIPT ANSWERS  Relationship to Patient? Self  Appointment reason? Well Care/Follow Ups  Select a Well Care/Follow Ups appointment reason? Adult ED Follow Up   [Emergency Room   Emergency Department]  (Patient requests to see provider urgently. )? Yes  Have you been diagnosed with   tested for   or told that you are suspected of having COVID-19 (Coronavirus)? No  Have you had a fever or taken medication to treat a fever within the past   3 days?  Yes

## 2020-09-29 ENCOUNTER — OFFICE VISIT (OUTPATIENT)
Dept: PRIMARY CARE CLINIC | Age: 25
End: 2020-09-29
Payer: COMMERCIAL

## 2020-09-29 PROCEDURE — G8428 CUR MEDS NOT DOCUMENT: HCPCS | Performed by: NURSE PRACTITIONER

## 2020-09-29 PROCEDURE — 99211 OFF/OP EST MAY X REQ PHY/QHP: CPT | Performed by: NURSE PRACTITIONER

## 2020-09-29 PROCEDURE — G8420 CALC BMI NORM PARAMETERS: HCPCS | Performed by: NURSE PRACTITIONER

## 2020-09-29 NOTE — PATIENT INSTRUCTIONS

## 2020-09-29 NOTE — PROGRESS NOTES
Stefan Ramirez received a viral test for COVID-19. They were educated on isolation and quarantine as appropriate. For any symptoms, they were directed to seek care from their PCP, given contact information to establish with a doctor, directed to an urgent care or the emergency room.

## 2020-10-01 LAB — SARS-COV-2, NAA: NOT DETECTED

## 2020-10-12 ENCOUNTER — TELEPHONE (OUTPATIENT)
Dept: FAMILY MEDICINE CLINIC | Age: 25
End: 2020-10-12

## 2020-10-12 NOTE — TELEPHONE ENCOUNTER
----- Message from Aneesh Hernandes sent at 10/12/2020 11:55 AM EDT -----  Subject: Message to Provider    QUESTIONS  Information for Provider? patient is requesting if dr. Nadeen Vela can do a   pregnancy test while she is at this appointment on 12/13/20 at 7:30 am   ---------------------------------------------------------------------------  --------------  [address]  What is the best way for the office to contact you? OK to leave message on   voicemail  Preferred Call Back Phone Number? [phone]  ---------------------------------------------------------------------------  --------------  SCRIPT ANSWERS  Relationship to Patient?  Self

## 2020-10-13 ENCOUNTER — OFFICE VISIT (OUTPATIENT)
Dept: FAMILY MEDICINE CLINIC | Age: 25
End: 2020-10-13
Payer: COMMERCIAL

## 2020-10-13 VITALS
DIASTOLIC BLOOD PRESSURE: 86 MMHG | SYSTOLIC BLOOD PRESSURE: 112 MMHG | HEIGHT: 67 IN | BODY MASS INDEX: 20.4 KG/M2 | WEIGHT: 130 LBS

## 2020-10-13 LAB
CONTROL: NORMAL
PREGNANCY TEST URINE, POC: NEGATIVE

## 2020-10-13 PROCEDURE — G8427 DOCREV CUR MEDS BY ELIG CLIN: HCPCS | Performed by: FAMILY MEDICINE

## 2020-10-13 PROCEDURE — 99213 OFFICE O/P EST LOW 20 MIN: CPT | Performed by: FAMILY MEDICINE

## 2020-10-13 PROCEDURE — G8420 CALC BMI NORM PARAMETERS: HCPCS | Performed by: FAMILY MEDICINE

## 2020-10-13 PROCEDURE — G8484 FLU IMMUNIZE NO ADMIN: HCPCS | Performed by: FAMILY MEDICINE

## 2020-10-13 PROCEDURE — 1036F TOBACCO NON-USER: CPT | Performed by: FAMILY MEDICINE

## 2020-10-13 PROCEDURE — 81025 URINE PREGNANCY TEST: CPT | Performed by: FAMILY MEDICINE

## 2020-10-13 RX ORDER — LORAZEPAM 0.5 MG/1
0.5 TABLET ORAL 2 TIMES DAILY PRN
Qty: 60 TABLET | Refills: 0 | Status: SHIPPED | OUTPATIENT
Start: 2020-10-13 | End: 2021-03-16 | Stop reason: SDUPTHER

## 2020-10-13 ASSESSMENT — PATIENT HEALTH QUESTIONNAIRE - PHQ9
SUM OF ALL RESPONSES TO PHQ9 QUESTIONS 1 & 2: 0
SUM OF ALL RESPONSES TO PHQ QUESTIONS 1-9: 0
2. FEELING DOWN, DEPRESSED OR HOPELESS: 0
SUM OF ALL RESPONSES TO PHQ QUESTIONS 1-9: 0
1. LITTLE INTEREST OR PLEASURE IN DOING THINGS: 0

## 2020-10-13 ASSESSMENT — ENCOUNTER SYMPTOMS
GASTROINTESTINAL NEGATIVE: 1
RESPIRATORY NEGATIVE: 1

## 2020-12-17 RX ORDER — NORELGESTROMIN AND ETHINYL ESTRADIOL 150; 35 UG/D; UG/D
PATCH TRANSDERMAL
Qty: 9 PATCH | Refills: 0 | Status: SHIPPED | OUTPATIENT
Start: 2020-12-17 | End: 2021-03-07

## 2021-01-04 ENCOUNTER — E-VISIT (OUTPATIENT)
Dept: INTERNAL MEDICINE CLINIC | Age: 26
End: 2021-01-04
Payer: COMMERCIAL

## 2021-01-04 DIAGNOSIS — N92.3 SPOTTING BETWEEN MENSES: Primary | ICD-10-CM

## 2021-01-04 PROCEDURE — 99421 OL DIG E/M SVC 5-10 MIN: CPT | Performed by: INTERNAL MEDICINE

## 2021-01-05 NOTE — PROGRESS NOTES
5-10 minutes were spent on the digital evaluation and management of this patient.   Diagnoses and all orders for this visit:    Spotting between menses

## 2021-03-07 RX ORDER — NORELGESTROMIN AND ETHINYL ESTRADIOL 150; 35 UG/D; UG/D
PATCH TRANSDERMAL
Qty: 9 PATCH | Refills: 0 | Status: SHIPPED | OUTPATIENT
Start: 2021-03-07 | End: 2021-05-12 | Stop reason: SDUPTHER

## 2021-03-08 ENCOUNTER — TELEPHONE (OUTPATIENT)
Dept: FAMILY MEDICINE CLINIC | Age: 26
End: 2021-03-08

## 2021-03-08 NOTE — TELEPHONE ENCOUNTER
Pt states her symptoms have resolved and looking over her form for work-she could fill out herself and she wanted to CAncel appt  3-11

## 2021-03-08 NOTE — TELEPHONE ENCOUNTER
----- Message from Delicia Verdin sent at 3/8/2021  2:17 PM EST -----  Subject: Message to Provider    QUESTIONS  Information for Provider? pt needs a note to go back to work. Has an appt   on 3/11. Needs to know if she can get sooner or appt sooner. Had diarrhea   and vomiting. Please call and advise.   ---------------------------------------------------------------------------  --------------  CALL BACK INFO  What is the best way for the office to contact you? OK to leave message on   voicemail  Preferred Call Back Phone Number? [phone]  ---------------------------------------------------------------------------  --------------  SCRIPT ANSWERS  Relationship to Patient?  Self

## 2021-03-16 ENCOUNTER — OFFICE VISIT (OUTPATIENT)
Dept: FAMILY MEDICINE CLINIC | Age: 26
End: 2021-03-16
Payer: COMMERCIAL

## 2021-03-16 VITALS
DIASTOLIC BLOOD PRESSURE: 82 MMHG | HEIGHT: 67 IN | SYSTOLIC BLOOD PRESSURE: 124 MMHG | BODY MASS INDEX: 21.66 KG/M2 | TEMPERATURE: 98 F | WEIGHT: 138 LBS

## 2021-03-16 DIAGNOSIS — F41.1 GAD (GENERALIZED ANXIETY DISORDER): Primary | ICD-10-CM

## 2021-03-16 DIAGNOSIS — R00.0 TACHYCARDIA: ICD-10-CM

## 2021-03-16 LAB — TSH REFLEX: 1.18 UIU/ML (ref 0.27–4.2)

## 2021-03-16 PROCEDURE — G8427 DOCREV CUR MEDS BY ELIG CLIN: HCPCS | Performed by: FAMILY MEDICINE

## 2021-03-16 PROCEDURE — 99213 OFFICE O/P EST LOW 20 MIN: CPT | Performed by: FAMILY MEDICINE

## 2021-03-16 PROCEDURE — G8484 FLU IMMUNIZE NO ADMIN: HCPCS | Performed by: FAMILY MEDICINE

## 2021-03-16 PROCEDURE — G8420 CALC BMI NORM PARAMETERS: HCPCS | Performed by: FAMILY MEDICINE

## 2021-03-16 PROCEDURE — 1036F TOBACCO NON-USER: CPT | Performed by: FAMILY MEDICINE

## 2021-03-16 PROCEDURE — 36415 COLL VENOUS BLD VENIPUNCTURE: CPT | Performed by: FAMILY MEDICINE

## 2021-03-16 RX ORDER — LORAZEPAM 0.5 MG/1
0.5 TABLET ORAL 2 TIMES DAILY PRN
Qty: 60 TABLET | Refills: 0 | Status: SHIPPED | OUTPATIENT
Start: 2021-03-16 | End: 2021-04-15

## 2021-03-16 RX ORDER — IBUPROFEN 800 MG/1
800 TABLET ORAL EVERY 8 HOURS PRN
Qty: 60 TABLET | Refills: 3 | Status: SHIPPED | OUTPATIENT
Start: 2021-03-16 | End: 2022-09-26 | Stop reason: SDUPTHER

## 2021-03-16 ASSESSMENT — ENCOUNTER SYMPTOMS
RESPIRATORY NEGATIVE: 1
GASTROINTESTINAL NEGATIVE: 1

## 2021-03-16 ASSESSMENT — PATIENT HEALTH QUESTIONNAIRE - PHQ9
SUM OF ALL RESPONSES TO PHQ QUESTIONS 1-9: 0
SUM OF ALL RESPONSES TO PHQ QUESTIONS 1-9: 0

## 2021-03-16 NOTE — PROGRESS NOTES
OUTPATIENT PROGRESS NOTE  Date of Service:  3/16/2021  Address: [address] [place] [address] [address] [facility],First Floor [address]  Dept: [phone]  Loc: [phone]    Subjective:      Patient ID:  <[id number]>  Radha Flynn is a 22 y.o. female     HPI  Anxiety   She takes lorazepam sparingly  Wants to have her thyroid checked   Hair thinning  No appetite  Wants to make sure that is not causing her tachycardia  She is seeing cardiology today  Pulse still above 100    Review of Systems   Constitutional: Negative. HENT: Negative. Respiratory: Negative. Cardiovascular: Positive for palpitations. Gastrointestinal: Negative. Neurological: Negative. Psychiatric/Behavioral: Positive for agitation and decreased concentration. The patient is nervous/anxious. Objective:   YOB: 1995    Date of Visit:  3/16/2021       Allergies   Allergen Reactions    Prochlorperazine Anxiety       Outpatient Medications Marked as Taking for the 3/16/21 encounter (Office Visit) with Brii Garcia DO   Medication Sig Dispense Refill    XULANE 150-35 MCG/24HR APPLY 1 PATCH ONCE A WEEK 9 patch 0    ivabradine (CORLANOR) 5 MG TABS tablet Take 5 mg by mouth 2 times daily      LORazepam (ATIVAN) 0.5 MG tablet Take 1 tablet by mouth 2 times daily as needed for Anxiety for up to 30 days. 60 tablet 0    ibuprofen (ADVIL;MOTRIN) 800 MG tablet Take 1 tablet by mouth every 8 hours as needed for Pain 60 tablet 3       Vitals:    03/16/21 0857   BP: 124/82   Temp: 98 °F (36.7 °C)   Weight: 138 lb (62.6 kg)   Height: 5' 7\" (1.702 m)     Body mass index is 21.61 kg/m². Wt Readings from Last 3 Encounters:   03/16/21 138 lb (62.6 kg)   10/13/20 130 lb (59 kg)   06/18/20 131 lb (59.4 kg)     BP Readings from Last 3 Encounters:   03/16/21 124/82   10/13/20 112/86   06/18/20 125/88       Physical Exam  Vitals signs and nursing note reviewed.    Constitutional: Appearance: She is well-developed. HENT:      Head: Normocephalic. Neck:      Thyroid: No thyromegaly. Cardiovascular:      Rate and Rhythm: Normal rate and regular rhythm. Heart sounds: Normal heart sounds. Pulmonary:      Effort: Pulmonary effort is normal.      Breath sounds: Normal breath sounds. Lymphadenopathy:      Cervical: No cervical adenopathy. Neurological:      Mental Status: She is alert and oriented to person, place, and time. Psychiatric:         Behavior: Behavior normal.         Thought Content: Thought content normal.         Judgment: Judgment normal.            Assessment/Plan         Assessment/plan;  Barrie Khan was seen today for follow-up, medication check and medication refill. Diagnoses and all orders for this visit:    NEENA (generalized anxiety disorder)  -     LORazepam (ATIVAN) 0.5 MG tablet; Take 1 tablet by mouth 2 times daily as needed for Anxiety for up to 30 days. Tachycardia  -     TSH with Reflex    Other orders  -     ibuprofen (ADVIL;MOTRIN) 800 MG tablet; Take 1 tablet by mouth every 8 hours as needed for Pain      Return if symptoms worsen or fail to improve.            Anuj Reina, DO

## 2021-05-12 ENCOUNTER — PATIENT MESSAGE (OUTPATIENT)
Dept: FAMILY MEDICINE CLINIC | Age: 26
End: 2021-05-12

## 2021-05-12 RX ORDER — NORELGESTROMIN AND ETHINYL ESTRADIOL 150; 35 UG/D; UG/D
PATCH TRANSDERMAL
Qty: 9 PATCH | Refills: 1 | Status: SHIPPED | OUTPATIENT
Start: 2021-05-12 | End: 2021-05-24

## 2021-05-12 NOTE — TELEPHONE ENCOUNTER
From: Yahir Feliciano  To: Mahsa Childs DO  Sent: 5/12/2021 1:35 PM EDT  Subject: Prescription Question    Hello,    [facility] was supposed to send a refill request for [place] and I just called and they said no one from the office has responded. Can I please have another refill sent to [address], [place] [address]. I only have one patch left. Thank you!

## 2021-05-24 RX ORDER — NORELGESTROMIN AND ETHINYL ESTRADIOL 150; 35 UG/D; UG/D
PATCH TRANSDERMAL
Qty: 9 PATCH | Refills: 1 | Status: SHIPPED | OUTPATIENT
Start: 2021-05-24 | End: 2021-10-05

## 2021-06-07 ENCOUNTER — E-VISIT (OUTPATIENT)
Dept: FAMILY MEDICINE CLINIC | Age: 26
End: 2021-06-07
Payer: COMMERCIAL

## 2021-06-07 DIAGNOSIS — L50.9 HIVES: Primary | ICD-10-CM

## 2021-06-07 PROCEDURE — 99421 OL DIG E/M SVC 5-10 MIN: CPT | Performed by: FAMILY MEDICINE

## 2021-06-07 RX ORDER — METHYLPREDNISOLONE 4 MG/1
TABLET ORAL
Qty: 1 KIT | Refills: 0 | Status: SHIPPED | OUTPATIENT
Start: 2021-06-07 | End: 2021-06-13

## 2021-08-12 ENCOUNTER — HOSPITAL ENCOUNTER (OUTPATIENT)
Age: 26
Discharge: HOME | End: 2021-08-12
Payer: COMMERCIAL

## 2021-08-12 ENCOUNTER — OFFICE VISIT (OUTPATIENT)
Dept: FAMILY MEDICINE CLINIC | Age: 26
End: 2021-08-12
Payer: COMMERCIAL

## 2021-08-12 ENCOUNTER — HOSPITAL ENCOUNTER (OUTPATIENT)
Dept: GENERAL RADIOLOGY | Age: 26
Discharge: HOME | End: 2021-08-12
Payer: COMMERCIAL

## 2021-08-12 VITALS
HEIGHT: 67 IN | BODY MASS INDEX: 22.22 KG/M2 | SYSTOLIC BLOOD PRESSURE: 110 MMHG | WEIGHT: 141.6 LBS | DIASTOLIC BLOOD PRESSURE: 80 MMHG

## 2021-08-12 DIAGNOSIS — M19.049 CMC ARTHRITIS: Primary | ICD-10-CM

## 2021-08-12 DIAGNOSIS — M19.049 CMC ARTHRITIS: ICD-10-CM

## 2021-08-12 DIAGNOSIS — F41.1 GAD (GENERALIZED ANXIETY DISORDER): ICD-10-CM

## 2021-08-12 DIAGNOSIS — L71.0 PERIORAL DERMATITIS: ICD-10-CM

## 2021-08-12 PROCEDURE — 73130 X-RAY EXAM OF HAND: CPT

## 2021-08-12 PROCEDURE — 99214 OFFICE O/P EST MOD 30 MIN: CPT | Performed by: FAMILY MEDICINE

## 2021-08-12 RX ORDER — LORAZEPAM 0.5 MG/1
0.5 TABLET ORAL 2 TIMES DAILY PRN
Qty: 60 TABLET | Refills: 0 | Status: SHIPPED | OUTPATIENT
Start: 2021-08-12 | End: 2021-10-14 | Stop reason: SDUPTHER

## 2021-08-12 RX ORDER — DOXYCYCLINE HYCLATE 100 MG
100 TABLET ORAL 2 TIMES DAILY
Qty: 60 TABLET | Refills: 0 | Status: SHIPPED | OUTPATIENT
Start: 2021-08-12 | End: 2021-09-05

## 2021-08-12 SDOH — ECONOMIC STABILITY: FOOD INSECURITY: WITHIN THE PAST 12 MONTHS, THE FOOD YOU BOUGHT JUST DIDN'T LAST AND YOU DIDN'T HAVE MONEY TO GET MORE.: NEVER TRUE

## 2021-08-12 SDOH — ECONOMIC STABILITY: FOOD INSECURITY: WITHIN THE PAST 12 MONTHS, YOU WORRIED THAT YOUR FOOD WOULD RUN OUT BEFORE YOU GOT MONEY TO BUY MORE.: NEVER TRUE

## 2021-08-12 ASSESSMENT — ENCOUNTER SYMPTOMS
COUGH: 0
EYE PAIN: 0
SHORTNESS OF BREATH: 0
VOMITING: 0
DIARRHEA: 0
RHINORRHEA: 0
SORE THROAT: 0

## 2021-08-12 ASSESSMENT — SOCIAL DETERMINANTS OF HEALTH (SDOH): HOW HARD IS IT FOR YOU TO PAY FOR THE VERY BASICS LIKE FOOD, HOUSING, MEDICAL CARE, AND HEATING?: NOT HARD AT ALL

## 2021-08-12 NOTE — PROGRESS NOTES
OUTPATIENT PROGRESS NOTE  Date of Service:  8/12/2021  Address: [address] [place] [address] [address] [facility],First Floor [address]  Dept: [phone]  Loc: [phone]    Subjective:      Patient ID:  <[id number]>  Beata Vazquez is a 32 y.o. female     Rash  The problem is unchanged. Location: around her mouth. The rash is characterized by redness and swelling. She was exposed to nothing. Associated symptoms include facial edema. Pertinent negatives include no anorexia, congestion, cough, diarrhea, eye pain, fatigue, fever, rhinorrhea, shortness of breath, sore throat or vomiting. Pain right thumb  She uses her hands a lot  Hurts to move her thumb certain ways  No injury she can recall    Hever  Needs her meds refilled  Takes prn and it does help   Just got engaged  No wedding plans     Review of Systems   Constitutional: Negative for fatigue and fever. HENT: Negative for congestion, rhinorrhea and sore throat. Eyes: Negative for pain. Respiratory: Negative for cough and shortness of breath. Gastrointestinal: Negative for anorexia, diarrhea and vomiting. Skin: Positive for rash. Objective:   YOB: 1995    Date of Visit:  8/12/2021       Allergies   Allergen Reactions    Prochlorperazine Anxiety       Outpatient Medications Marked as Taking for the 8/12/21 encounter (Office Visit) with Alma Orellana, DO   Medication Sig Dispense Refill    LORazepam (ATIVAN) 0.5 MG tablet Take 1 tablet by mouth 2 times daily as needed for Anxiety for up to 30 days.  60 tablet 0    doxycycline hyclate (VIBRA-TABS) 100 MG tablet Take 1 tablet by mouth 2 times daily 60 tablet 0    XULANE 150-35 MCG/24HR APPLY 1 PATCH ONCE A WEEK 9 patch 1    ibuprofen (ADVIL;MOTRIN) 800 MG tablet Take 1 tablet by mouth every 8 hours as needed for Pain 60 tablet 3       Vitals:    08/12/21 0851   BP: 110/80   Site: Right Upper Arm   Position: Sitting   Cuff Size: Small Adult   Weight: 141 lb 9.6 oz (64.2 kg)   Height: 5' 7\" (1.702 m)     Body mass index is 22.18 kg/m². Wt Readings from Last 3 Encounters:   08/12/21 141 lb 9.6 oz (64.2 kg)   03/16/21 138 lb (62.6 kg)   10/13/20 130 lb (59 kg)     BP Readings from Last 3 Encounters:   08/12/21 110/80   03/16/21 124/82   10/13/20 112/86       Physical Exam  Vitals and nursing note reviewed. Constitutional:       Appearance: She is well-developed. HENT:      Head: Normocephalic. Neck:      Thyroid: No thyromegaly. Cardiovascular:      Rate and Rhythm: Normal rate and regular rhythm. Heart sounds: Normal heart sounds. Pulmonary:      Effort: Pulmonary effort is normal.      Breath sounds: Normal breath sounds. Musculoskeletal:         General: Tenderness (right hand over cmc joint with tenderness and some swelling ) present. Lymphadenopathy:      Cervical: No cervical adenopathy. Neurological:      Mental Status: She is alert and oriented to person, place, and time. Psychiatric:         Behavior: Behavior normal.         Thought Content: Thought content normal.         Judgment: Judgment normal.            Assessment/Plan       Bella List was seen today for swelling and rash. Diagnoses and all orders for this visit:    CMC arthritis  -     Cancel: XR HAND RIGHT (2 VIEWS); Future    NEENA (generalized anxiety disorder)  -     LORazepam (ATIVAN) 0.5 MG tablet; Take 1 tablet by mouth 2 times daily as needed for Anxiety for up to 30 days. Perioral dermatitis  -     doxycycline hyclate (VIBRA-TABS) 100 MG tablet;  Take 1 tablet by mouth 2 times daily    let me know if her face is not clearing                  Dhaval Reddy DO

## 2021-09-05 DIAGNOSIS — L71.0 PERIORAL DERMATITIS: ICD-10-CM

## 2021-09-05 RX ORDER — DOXYCYCLINE HYCLATE 100 MG
TABLET ORAL
Qty: 60 TABLET | Refills: 0 | Status: SHIPPED | OUTPATIENT
Start: 2021-09-05 | End: 2021-10-14 | Stop reason: ALTCHOICE

## 2021-10-05 RX ORDER — NORELGESTROMIN AND ETHINYL ESTRADIOL 150; 35 UG/D; UG/D
PATCH TRANSDERMAL
Qty: 3 PATCH | Refills: 5 | Status: SHIPPED | OUTPATIENT
Start: 2021-10-05 | End: 2022-02-16

## 2021-10-14 ENCOUNTER — OFFICE VISIT (OUTPATIENT)
Dept: FAMILY MEDICINE CLINIC | Age: 26
End: 2021-10-14
Payer: COMMERCIAL

## 2021-10-14 VITALS
BODY MASS INDEX: 22.13 KG/M2 | SYSTOLIC BLOOD PRESSURE: 134 MMHG | WEIGHT: 141 LBS | DIASTOLIC BLOOD PRESSURE: 80 MMHG | HEIGHT: 67 IN

## 2021-10-14 DIAGNOSIS — F41.1 GAD (GENERALIZED ANXIETY DISORDER): ICD-10-CM

## 2021-10-14 PROCEDURE — 99213 OFFICE O/P EST LOW 20 MIN: CPT | Performed by: FAMILY MEDICINE

## 2021-10-14 RX ORDER — LORAZEPAM 0.5 MG/1
0.5 TABLET ORAL 2 TIMES DAILY PRN
Qty: 60 TABLET | Refills: 0 | Status: SHIPPED | OUTPATIENT
Start: 2021-10-14 | End: 2022-08-09 | Stop reason: SDUPTHER

## 2021-10-14 ASSESSMENT — ENCOUNTER SYMPTOMS
GASTROINTESTINAL NEGATIVE: 1
RESPIRATORY NEGATIVE: 1

## 2021-10-14 ASSESSMENT — PATIENT HEALTH QUESTIONNAIRE - PHQ9
SUM OF ALL RESPONSES TO PHQ QUESTIONS 1-9: 0
2. FEELING DOWN, DEPRESSED OR HOPELESS: 0
1. LITTLE INTEREST OR PLEASURE IN DOING THINGS: 0
SUM OF ALL RESPONSES TO PHQ9 QUESTIONS 1 & 2: 0

## 2021-10-14 NOTE — PROGRESS NOTES
OUTPATIENT PROGRESS NOTE  Date of Service:  10/14/2021  Address: [facility]  [address] [place] [address],First Floor [address]  Dept: [phone]  Loc: [phone]    Subjective:      Patient ID:  <[id number]>  Abdias Cobian is a 32 y.o. female     HPI   Anxiety  She is trying to only take the lorazepam prn  She gets irritable and anxious  She is depressed some  Her mood fluctuates  She has tried daily ssri but had issues   Not suicidal     Review of Systems   Constitutional: Negative. HENT: Negative. Respiratory: Negative. Cardiovascular: Negative. Gastrointestinal: Negative. Neurological: Negative. Psychiatric/Behavioral: Positive for agitation, decreased concentration and sleep disturbance. The patient is nervous/anxious. Objective:   YOB: 1995    Date of Visit:  10/14/2021       Allergies   Allergen Reactions    Prochlorperazine Anxiety       Outpatient Medications Marked as Taking for the 10/14/21 encounter (Office Visit) with Nellie Pastroshan, DO   Medication Sig Dispense Refill    XULANE 150-35 MCG/24HR APPLY 1 PATCH ONCE A WEEK 3 patch 5    LORazepam (ATIVAN) 0.5 MG tablet Take 1 tablet by mouth 2 times daily as needed for Anxiety for up to 30 days. 60 tablet 0    ibuprofen (ADVIL;MOTRIN) 800 MG tablet Take 1 tablet by mouth every 8 hours as needed for Pain 60 tablet 3       Vitals:    10/14/21 0817   BP: 134/80   Weight: 141 lb (64 kg)   Height: 5' 7\" (1.702 m)     Body mass index is 22.08 kg/m². Wt Readings from Last 3 Encounters:   10/14/21 141 lb (64 kg)   08/12/21 141 lb 9.6 oz (64.2 kg)   03/16/21 138 lb (62.6 kg)     BP Readings from Last 3 Encounters:   10/14/21 134/80   08/12/21 110/80   03/16/21 124/82       Physical Exam  Constitutional:       General: She is not in acute distress. Appearance: She is well-developed. HENT:      Head: Normocephalic.    Neurological:      Mental Status: She is alert and oriented to person, place, and time. Psychiatric:         Behavior: Behavior normal.         Thought Content: Thought content normal.         Judgment: Judgment normal.            Assessment/Plan       Koffi Sandoval was seen today for anxiety and depression.     Diagnoses and all orders for this visit:    NEENA (generalized anxiety disorder)      Genesight testing due to patient not doing well on previous ssri                  Jessica Gram, DO

## 2021-11-08 ENCOUNTER — PATIENT MESSAGE (OUTPATIENT)
Dept: FAMILY MEDICINE CLINIC | Age: 26
End: 2021-11-08

## 2021-11-08 RX ORDER — BUPROPION HYDROCHLORIDE 150 MG/1
150 TABLET ORAL EVERY MORNING
Qty: 30 TABLET | Refills: 3 | Status: SHIPPED | OUTPATIENT
Start: 2021-11-08 | End: 2021-12-13

## 2021-11-08 NOTE — TELEPHONE ENCOUNTER
From: Chance Moreira  To: Dr. Mathew Elizondo: 11/8/2021 4:01 PM EST  Subject: Visit Follow-Up Question    Hello    I got a letter in the mail from [employer] and was wondering if my test results were available? Thank you!

## 2021-12-13 RX ORDER — BUPROPION HYDROCHLORIDE 150 MG/1
TABLET ORAL
Qty: 30 TABLET | Refills: 3 | Status: SHIPPED | OUTPATIENT
Start: 2021-12-13 | End: 2022-08-11

## 2022-01-10 ENCOUNTER — VIRTUAL VISIT (OUTPATIENT)
Dept: FAMILY MEDICINE CLINIC | Age: 27
End: 2022-01-10
Payer: COMMERCIAL

## 2022-01-10 DIAGNOSIS — U07.1 COVID-19: ICD-10-CM

## 2022-01-10 DIAGNOSIS — H66.90 ACUTE OTITIS MEDIA, UNSPECIFIED OTITIS MEDIA TYPE: Primary | ICD-10-CM

## 2022-01-10 PROCEDURE — 99213 OFFICE O/P EST LOW 20 MIN: CPT | Performed by: FAMILY MEDICINE

## 2022-01-10 RX ORDER — AZITHROMYCIN 250 MG/1
250 TABLET, FILM COATED ORAL SEE ADMIN INSTRUCTIONS
Qty: 6 TABLET | Refills: 0 | Status: SHIPPED | OUTPATIENT
Start: 2022-01-10 | End: 2022-01-15

## 2022-01-19 ENCOUNTER — OFFICE VISIT (OUTPATIENT)
Dept: FAMILY MEDICINE CLINIC | Age: 27
End: 2022-01-19
Payer: COMMERCIAL

## 2022-01-19 VITALS
HEIGHT: 67 IN | WEIGHT: 148 LBS | SYSTOLIC BLOOD PRESSURE: 122 MMHG | BODY MASS INDEX: 23.23 KG/M2 | TEMPERATURE: 97.7 F | DIASTOLIC BLOOD PRESSURE: 80 MMHG

## 2022-01-19 DIAGNOSIS — U07.1 COVID-19: Primary | ICD-10-CM

## 2022-01-19 PROCEDURE — 99213 OFFICE O/P EST LOW 20 MIN: CPT | Performed by: FAMILY MEDICINE

## 2022-01-19 ASSESSMENT — ENCOUNTER SYMPTOMS
RESPIRATORY NEGATIVE: 1
GASTROINTESTINAL NEGATIVE: 1

## 2022-01-19 NOTE — PROGRESS NOTES
Susie Strickland (:  1995) is a 32 y.o. female,Established patient, here for evaluation of the following chief complaint(s):  Follow-up (follow up from Covid tested positive on 22)         ASSESSMENT/PLAN:  1. COVID-19  feeling better  No restrictions    Call if increasing symptoms         Subjective   SUBJECTIVE/OBJECTIVE:  HPI  covid 19  She is starting to feel better  Energy slowly returning  Cough improving  Had gi symptoms which resolved   Review of Systems   Constitutional: Negative. HENT: Negative. Respiratory: Negative. Cardiovascular: Negative. Gastrointestinal: Negative. Neurological: Negative. Psychiatric/Behavioral: Positive for decreased concentration. Objective   Physical Exam  Constitutional:       General: She is not in acute distress. Appearance: She is well-developed. HENT:      Head: Normocephalic. Neurological:      Mental Status: She is alert and oriented to person, place, and time. Psychiatric:         Behavior: Behavior normal.         Thought Content: Thought content normal.         Judgment: Judgment normal.                  An electronic signature was used to authenticate this note.     --Hans Gutierres DO

## 2022-02-16 RX ORDER — NORELGESTROMIN AND ETHINYL ESTRADIOL 150; 35 UG/D; UG/D
PATCH TRANSDERMAL
Qty: 3 PATCH | Refills: 5 | Status: SHIPPED | OUTPATIENT
Start: 2022-02-16 | End: 2022-08-10

## 2022-08-09 ENCOUNTER — OFFICE VISIT (OUTPATIENT)
Dept: FAMILY MEDICINE CLINIC | Age: 27
End: 2022-08-09
Payer: COMMERCIAL

## 2022-08-09 VITALS
DIASTOLIC BLOOD PRESSURE: 70 MMHG | BODY MASS INDEX: 25.55 KG/M2 | HEIGHT: 67 IN | WEIGHT: 162.8 LBS | SYSTOLIC BLOOD PRESSURE: 126 MMHG

## 2022-08-09 DIAGNOSIS — M67.432 GANGLION CYST OF WRIST, LEFT: ICD-10-CM

## 2022-08-09 DIAGNOSIS — N89.8 VAGINAL DISCHARGE: ICD-10-CM

## 2022-08-09 DIAGNOSIS — F41.1 GAD (GENERALIZED ANXIETY DISORDER): ICD-10-CM

## 2022-08-09 DIAGNOSIS — Z01.419 WELL FEMALE EXAM WITH ROUTINE GYNECOLOGICAL EXAM: Primary | ICD-10-CM

## 2022-08-09 LAB
C TRACH DNA GENITAL QL NAA+PROBE: NEGATIVE
N. GONORRHOEAE DNA: NEGATIVE

## 2022-08-09 PROCEDURE — 99395 PREV VISIT EST AGE 18-39: CPT | Performed by: FAMILY MEDICINE

## 2022-08-09 RX ORDER — LORAZEPAM 0.5 MG/1
0.5 TABLET ORAL 2 TIMES DAILY PRN
Qty: 60 TABLET | Refills: 0 | Status: SHIPPED | OUTPATIENT
Start: 2022-08-09 | End: 2022-10-25 | Stop reason: SDUPTHER

## 2022-08-09 ASSESSMENT — ENCOUNTER SYMPTOMS
SORE THROAT: 0
BACK PAIN: 0
CONSTIPATION: 0
NAUSEA: 0
ABDOMINAL PAIN: 0
DIARRHEA: 0

## 2022-08-10 LAB
CANDIDA SPECIES, DNA PROBE: ABNORMAL
GARDNERELLA VAGINALIS, DNA PROBE: ABNORMAL
TRICHOMONAS VAGINALIS DNA: ABNORMAL

## 2022-08-10 RX ORDER — METRONIDAZOLE 250 MG/1
250 TABLET ORAL 3 TIMES DAILY
Qty: 21 TABLET | Refills: 0 | Status: SHIPPED | OUTPATIENT
Start: 2022-08-10 | End: 2022-08-11 | Stop reason: SINTOL

## 2022-08-10 RX ORDER — NORELGESTROMIN AND ETHINYL ESTRADIOL 150; 35 UG/D; UG/D
PATCH TRANSDERMAL
Qty: 9 PATCH | Refills: 1 | Status: SHIPPED | OUTPATIENT
Start: 2022-08-10

## 2022-08-11 ENCOUNTER — PATIENT MESSAGE (OUTPATIENT)
Dept: FAMILY MEDICINE CLINIC | Age: 27
End: 2022-08-11

## 2022-08-11 ENCOUNTER — APPOINTMENT (OUTPATIENT)
Dept: CT IMAGING | Age: 27
End: 2022-08-11
Payer: COMMERCIAL

## 2022-08-11 ENCOUNTER — HOSPITAL ENCOUNTER (EMERGENCY)
Age: 27
Discharge: HOME | End: 2022-08-11
Payer: COMMERCIAL

## 2022-08-11 VITALS
TEMPERATURE: 98.5 F | RESPIRATION RATE: 18 BRPM | BODY MASS INDEX: 25.27 KG/M2 | HEART RATE: 92 BPM | OXYGEN SATURATION: 100 % | DIASTOLIC BLOOD PRESSURE: 98 MMHG | WEIGHT: 161.31 LBS | SYSTOLIC BLOOD PRESSURE: 136 MMHG

## 2022-08-11 DIAGNOSIS — T50.905A ADVERSE REACTION TO DRUG, INITIAL ENCOUNTER: ICD-10-CM

## 2022-08-11 DIAGNOSIS — R00.0 TACHYCARDIA: Primary | ICD-10-CM

## 2022-08-11 LAB
A/G RATIO: 1.3 (ref 1.1–2.2)
ALBUMIN SERPL-MCNC: 4.1 G/DL (ref 3.4–5)
ALP BLD-CCNC: 62 U/L (ref 40–129)
ALT SERPL-CCNC: 11 U/L (ref 10–40)
ANION GAP SERPL CALCULATED.3IONS-SCNC: 6 MMOL/L (ref 3–16)
AST SERPL-CCNC: 17 U/L (ref 15–37)
BASOPHILS ABSOLUTE: 0 K/UL (ref 0–0.2)
BASOPHILS RELATIVE PERCENT: 0.6 %
BILIRUB SERPL-MCNC: 0.3 MG/DL (ref 0–1)
BUN BLDV-MCNC: 12 MG/DL (ref 7–20)
CALCIUM SERPL-MCNC: 9.3 MG/DL (ref 8.3–10.6)
CHLORIDE BLD-SCNC: 105 MMOL/L (ref 99–110)
CO2: 29 MMOL/L (ref 21–32)
CREAT SERPL-MCNC: 0.6 MG/DL (ref 0.6–1.1)
D DIMER: 0.8 UG/ML FEU (ref 0–0.6)
EKG ATRIAL RATE: 107 BPM
EKG DIAGNOSIS: NORMAL
EKG P AXIS: 58 DEGREES
EKG P-R INTERVAL: 128 MS
EKG Q-T INTERVAL: 336 MS
EKG QRS DURATION: 70 MS
EKG QTC CALCULATION (BAZETT): 448 MS
EKG R AXIS: 43 DEGREES
EKG T AXIS: 31 DEGREES
EKG VENTRICULAR RATE: 107 BPM
EOSINOPHILS ABSOLUTE: 0.1 K/UL (ref 0–0.6)
EOSINOPHILS RELATIVE PERCENT: 3.1 %
GFR AFRICAN AMERICAN: >60
GFR NON-AFRICAN AMERICAN: >60
GLUCOSE BLD-MCNC: 92 MG/DL (ref 70–99)
HCG QUALITATIVE: NEGATIVE
HCT VFR BLD CALC: 41.4 % (ref 36–48)
HEMOGLOBIN: 14 G/DL (ref 12–16)
LYMPHOCYTES ABSOLUTE: 1.4 K/UL (ref 1–5.1)
LYMPHOCYTES RELATIVE PERCENT: 29.7 %
MCH RBC QN AUTO: 30.5 PG (ref 26–34)
MCHC RBC AUTO-ENTMCNC: 33.9 G/DL (ref 31–36)
MCV RBC AUTO: 90 FL (ref 80–100)
MONOCYTES ABSOLUTE: 0.3 K/UL (ref 0–1.3)
MONOCYTES RELATIVE PERCENT: 6.5 %
NEUTROPHILS ABSOLUTE: 2.8 K/UL (ref 1.7–7.7)
NEUTROPHILS RELATIVE PERCENT: 60.1 %
PDW BLD-RTO: 12.7 % (ref 12.4–15.4)
PLATELET # BLD: 254 K/UL (ref 135–450)
PMV BLD AUTO: 8.3 FL (ref 5–10.5)
POTASSIUM SERPL-SCNC: 4.1 MMOL/L (ref 3.5–5.1)
RBC # BLD: 4.6 M/UL (ref 4–5.2)
SODIUM BLD-SCNC: 140 MMOL/L (ref 136–145)
TOTAL PROTEIN: 7.2 G/DL (ref 6.4–8.2)
WBC # BLD: 4.7 K/UL (ref 4–11)

## 2022-08-11 PROCEDURE — 2580000003 HC RX 258: Performed by: PHYSICIAN ASSISTANT

## 2022-08-11 PROCEDURE — 84703 CHORIONIC GONADOTROPIN ASSAY: CPT

## 2022-08-11 PROCEDURE — 96360 HYDRATION IV INFUSION INIT: CPT

## 2022-08-11 PROCEDURE — 85025 COMPLETE CBC W/AUTO DIFF WBC: CPT

## 2022-08-11 PROCEDURE — 96361 HYDRATE IV INFUSION ADD-ON: CPT

## 2022-08-11 PROCEDURE — 85379 FIBRIN DEGRADATION QUANT: CPT

## 2022-08-11 PROCEDURE — 93005 ELECTROCARDIOGRAM TRACING: CPT | Performed by: EMERGENCY MEDICINE

## 2022-08-11 PROCEDURE — 6360000004 HC RX CONTRAST MEDICATION: Performed by: PHYSICIAN ASSISTANT

## 2022-08-11 PROCEDURE — 80053 COMPREHEN METABOLIC PANEL: CPT

## 2022-08-11 PROCEDURE — 93010 ELECTROCARDIOGRAM REPORT: CPT | Performed by: INTERNAL MEDICINE

## 2022-08-11 PROCEDURE — 99285 EMERGENCY DEPT VISIT HI MDM: CPT

## 2022-08-11 PROCEDURE — 71260 CT THORAX DX C+: CPT | Performed by: PHYSICIAN ASSISTANT

## 2022-08-11 RX ORDER — CLINDAMYCIN HYDROCHLORIDE 300 MG/1
300 CAPSULE ORAL 3 TIMES DAILY
Qty: 21 CAPSULE | Refills: 0 | Status: SHIPPED | OUTPATIENT
Start: 2022-08-11 | End: 2022-08-30 | Stop reason: SDUPTHER

## 2022-08-11 RX ORDER — 0.9 % SODIUM CHLORIDE 0.9 %
1000 INTRAVENOUS SOLUTION INTRAVENOUS ONCE
Status: COMPLETED | OUTPATIENT
Start: 2022-08-11 | End: 2022-08-11

## 2022-08-11 RX ADMIN — IOPAMIDOL 65 ML: 755 INJECTION, SOLUTION INTRAVENOUS at 10:32

## 2022-08-11 RX ADMIN — SODIUM CHLORIDE 1000 ML: 9 INJECTION, SOLUTION INTRAVENOUS at 09:20

## 2022-08-11 ASSESSMENT — ENCOUNTER SYMPTOMS
NAUSEA: 0
ABDOMINAL PAIN: 0
CONSTIPATION: 0
BACK PAIN: 0
DIARRHEA: 0
CHEST TIGHTNESS: 1
RHINORRHEA: 0
VOMITING: 0
EYE PAIN: 0
SHORTNESS OF BREATH: 0
COUGH: 0
SORE THROAT: 0

## 2022-08-11 NOTE — ED NOTES
Pt stated heart rate feels like it keeps increasing and stomach is upset but not nauseous     Lili Cruz RN  08/11/22 7101

## 2022-08-11 NOTE — ED PROVIDER NOTES
[facility]        Pt Name: Jane Galvez  MRN: [id number]  [place] 1995  Date of evaluation: 8/11/2022  Provider: ALLAN Barron  PCP: Jose Baca DO  Note Started: 9:37 AM EDT       REGULO. I have evaluated this patient. My supervising physician was available for consultation. CHIEF COMPLAINT       Chief Complaint   Patient presents with    Tachycardia     Patient started taking flagyl for the first time this morning at 0730. Also took . 5 Ativan, but has taken that before. While driving to work, states her body became weak, hands shaky, and heart started to race. HISTORY OF PRESENT ILLNESS   (Location, Timing/Onset, Context/Setting, Quality, Duration, Modifying Factors, Severity, Associated Signs and Symptoms)  Note limiting factors. Chief Complaint: tachycardia. Jane Galvez is a 32 y.o. female who presents tachycardia starting this morning after she took her Flagyl medication. Patient states that she was taking a lot of medication today due to a urinary tract infection was prescribed from her primary care doctor. Patient states that she has never taken Flagyl before and developed palpitations and some chest tightness approximately 30 minutes after taking this medication. Patient states that she has not had any other medications yet. Patient denies any shortness of breath difficulty breathing. Patient states that she does take birth control. Patient states that due to the tachycardia that she was having at work she took a half of a lorazepam that she has for anxiety which is somewhat helped with her symptoms. Patient denies any nausea vomiting or abdominal pain. Nursing Notes were all reviewed and agreed with or any disagreements were addressed in the HPI.     REVIEW OF SYSTEMS    (2-9 systems for level 4, 10 or more for level 5)     Review of Systems   Constitutional:  Negative for chills, diaphoresis and fever. HENT:  Negative for congestion, rhinorrhea and sore throat. Eyes:  Negative for pain and visual disturbance. Respiratory:  Positive for chest tightness. Negative for cough and shortness of breath. Cardiovascular:  Positive for palpitations. Negative for chest pain and leg swelling. Gastrointestinal:  Negative for abdominal pain, constipation, diarrhea, nausea and vomiting. Genitourinary:  Negative for difficulty urinating, dysuria and frequency. Musculoskeletal:  Negative for back pain and neck pain. Skin:  Negative for rash and wound. Neurological:  Negative for dizziness and light-headedness. Positives and Pertinent negatives as per HPI. Except as noted above in the ROS, all other systems were reviewed and negative. PAST MEDICAL HISTORY     Past Medical History:   Diagnosis Date    Inappropriate sinus tachycardia 2016    Well adolescent visit          SURGICAL HISTORY   History reviewed. No pertinent surgical history. [address]       Discharge Medication List as of 8/11/2022 11:07 AM        CONTINUE these medications which have NOT CHANGED    Details   XULANE 150-35 MCG/24HR APPLY 1 PATCH ONCE A WEEK, Disp-9 patch, R-1Normal      LORazepam (ATIVAN) 0.5 MG tablet Take 1 tablet by mouth 2 times daily as needed for Anxiety for up to 30 days. , Disp-60 tablet, R-0Normal      ibuprofen (ADVIL;MOTRIN) 800 MG tablet Take 1 tablet by mouth every 8 hours as needed for Pain, Disp-60 tablet, R-3Normal               ALLERGIES     Metronidazole and Prochlorperazine    FAMILYHISTORY     History reviewed. No pertinent family history.        SOCIAL HISTORY       Social History     Tobacco Use    Smoking status: Never    Smokeless tobacco: Never    Tobacco comments:     encouraged to never smoke    Substance Use Topics    Alcohol use: No     Alcohol/week: 0.0 standard drinks    Drug use: No       SCREENINGS    Joanie Coma Scale  Eye Opening: Spontaneous  Best Verbal Response: Oriented  Best Motor Response: Obeys commands  [place] Coma Scale Score: 15        PHYSICAL EXAM    (up to 7 for level 4, 8 or more for level 5)     ED Triage Vitals [08/11/22 0836]   BP Temp Temp src Heart Rate Resp SpO2 Height Weight   (!) 145/89 98.5 °F (36.9 °C) -- (!) 110 18 100 % -- 161 lb 5 oz (73.2 kg)       Physical Exam  Vitals and nursing note reviewed. Constitutional:       General: She is not in acute distress. Appearance: She is normal weight. She is not ill-appearing. HENT:      Head: Normocephalic. Mouth/Throat:      Mouth: Mucous membranes are moist.      Pharynx: Oropharynx is clear. No oropharyngeal exudate or posterior oropharyngeal erythema. Eyes:      General: No scleral icterus. Right eye: No discharge. Left eye: No discharge. Extraocular Movements: Extraocular movements intact. Conjunctiva/sclera: Conjunctivae normal.   Cardiovascular:      Rate and Rhythm: Regular rhythm. Tachycardia present. Pulses: Normal pulses. Heart sounds: Normal heart sounds. Pulmonary:      Effort: Pulmonary effort is normal.      Breath sounds: Normal breath sounds. Abdominal:      General: Bowel sounds are normal. There is no distension. Palpations: There is no mass. Tenderness: There is no abdominal tenderness. Musculoskeletal:      Cervical back: Normal range of motion and neck supple. Right lower leg: No edema. Left lower leg: No edema. Neurological:      Mental Status: She is alert and oriented to person, place, and time.    Psychiatric:         Mood and Affect: Mood normal.         Behavior: Behavior normal.       DIAGNOSTIC RESULTS   LABS:    Labs Reviewed   D-DIMER, QUANTITATIVE - Abnormal; Notable for the following components:       Result Value    D-Dimer, Quant 0.80 (*)     All other components within normal limits   COMPREHENSIVE METABOLIC PANEL   CBC WITH AUTO DIFFERENTIAL   HCG, SERUM, QUALITATIVE       When ordered only abnormal lab results are displayed. All other labs were within normal range or not returned as of this dictation. EKG: When ordered, EKG's are interpreted by the Emergency Department Physician in the absence of a cardiologist.  Please see their note for interpretation of EKG. RADIOLOGY:   Non-plain film images such as CT, Ultrasound and MRI are read by the radiologist. Plain radiographic images are visualized and preliminarily interpreted by the ED Provider with the below findings:        Interpretation per the Radiologist below, if available at the time of this note:    CT CHEST PULMONARY EMBOLISM W CONTRAST   Final Result   1. No pulmonary embolism. 2. The lungs are clear with no acute finding in the chest.           No results found. PROCEDURES   Unless otherwise noted below, none     Procedures    CRITICAL CARE TIME       CONSULTS:  None      EMERGENCY DEPARTMENT COURSE and DIFFERENTIAL DIAGNOSIS/MDM:   Vitals:    Vitals:    08/11/22 1030 08/11/22 1045 08/11/22 1100 08/11/22 1103   BP: 131/83 (!) 138/91 136/89 (!) 136/98   Pulse: 89 98 90 92   Resp: 20 16 18    Temp:       SpO2:  100%     Weight:           Patient was given the following medications:  Medications   0.9 % sodium chloride bolus (0 mLs IntraVENous Stopped 8/11/22 1104)   iopamidol (ISOVUE-370) 76 % injection 65 mL (65 mLs IntraVENous Given 8/11/22 1032)         Is this patient to be included in the SEP-1 Core Measure due to severe sepsis or septic shock? No   Exclusion criteria - the patient is NOT to be included for SEP-1 Core Measure due to: Infection is not suspected    59-year-old female presents emergency department due to adverse reaction to metronidazole medication that she took this morning. Patient states that she took her medication she developed tachycardia and chest tightness. Patient states that she took some of her antianxiety medication which did help, improve her symptoms.   Patient go to the emergency department for further evaluation. Patient noted to have tachycardia when she first arrived in the emergency department for this reason CBC CMP IV fluids and D-dimer were ordered. Patient's D-dimer testing was elevated for this reason there was concern for possible PE as the patient does take birth control and is tachycardic here today. CT scan of the chest did not reveal any pulmonary embolisms. Patient's other blood work were unremarkable. Patient was in the emergency department for over 2 hours did not have any worsening symptoms and her tachycardia improved. Patient be discharged at this time to follow-up with her primary care doctor in 1 week for recheck. At this time I have low suspicion for pneumonia, pertussis, hemothorax, pneumothorax, pericarditis, myocarditis, tension pneumothorax, influenza, COVID-19, pulmonary embolism, Aortic dissection, AAA or other acute emergency pathologies. FINAL IMPRESSION      1. Tachycardia    2.  Adverse reaction to drug, initial encounter          DISPOSITION/PLAN   DISPOSITION Decision To Discharge 08/11/2022 11:03:25 AM      PATIENT REFERRED TO:  Dawna Andino [address]  [place] [address]  [phone]    Schedule an appointment as soon as possible for a visit in 1 week  recheck    [facility] Emergency Department  [address] [facility]  [phone]    As needed, If symptoms worsen    DISCHARGE MEDICATIONS:  Discharge Medication List as of 8/11/2022 11:07 AM          DISCONTINUED MEDICATIONS:  Discharge Medication List as of 8/11/2022 11:07 AM        STOP taking these medications       buPROPion (WELLBUTRIN XL) 150 MG extended release tablet Comments:   Reason for Stopping:                      (Please note that portions of this note were completed with a voice recognition program.  Efforts were made to edit the dictations but occasionally words are mis-transcribed.)    ALLAN Og (electronically signed)           ALLAN Estrada  08/11/22 [phone]

## 2022-08-11 NOTE — Clinical Note
Enzo Alonzo was seen and treated in our emergency department on 8/11/2022. She may return to work on 08/13/2022. If you have any questions or concerns, please don't hesitate to call.       ALLAN Rees

## 2022-08-11 NOTE — DISCHARGE INSTRUCTIONS
Follow with your primary care doctor next week    Stop taking metronidazole    Return to emergency department if you have worsening symptoms, fever, chills, chest pain, shortness of breath difficulty breathing or throat swelling.

## 2022-08-30 RX ORDER — CLINDAMYCIN HYDROCHLORIDE 300 MG/1
300 CAPSULE ORAL 3 TIMES DAILY
Qty: 21 CAPSULE | Refills: 0 | Status: SHIPPED | OUTPATIENT
Start: 2022-08-30 | End: 2022-09-06

## 2022-09-26 RX ORDER — IBUPROFEN 800 MG/1
800 TABLET ORAL EVERY 8 HOURS PRN
Qty: 60 TABLET | Refills: 3 | Status: SHIPPED | OUTPATIENT
Start: 2022-09-26 | End: 2022-10-16 | Stop reason: SDUPTHER

## 2022-09-26 NOTE — TELEPHONE ENCOUNTER
Last office visit 8/9/2022     Last written      Next office visit scheduled Visit date not found    Requested Prescriptions     Pending Prescriptions Disp Refills    ibuprofen (ADVIL;MOTRIN) 800 MG tablet 60 tablet 3     Sig: Take 1 tablet by mouth every 8 hours as needed for Pain

## 2022-10-16 RX ORDER — IBUPROFEN 800 MG/1
800 TABLET ORAL EVERY 8 HOURS PRN
Qty: 60 TABLET | Refills: 3 | Status: SHIPPED | OUTPATIENT
Start: 2022-10-16

## 2022-10-25 ENCOUNTER — PATIENT MESSAGE (OUTPATIENT)
Dept: FAMILY MEDICINE CLINIC | Age: 27
End: 2022-10-25

## 2022-10-25 ENCOUNTER — OFFICE VISIT (OUTPATIENT)
Dept: FAMILY MEDICINE CLINIC | Age: 27
End: 2022-10-25
Payer: COMMERCIAL

## 2022-10-25 ENCOUNTER — TELEPHONE (OUTPATIENT)
Dept: FAMILY MEDICINE CLINIC | Age: 27
End: 2022-10-25

## 2022-10-25 VITALS
HEIGHT: 67 IN | WEIGHT: 165 LBS | SYSTOLIC BLOOD PRESSURE: 124 MMHG | DIASTOLIC BLOOD PRESSURE: 88 MMHG | BODY MASS INDEX: 25.9 KG/M2

## 2022-10-25 DIAGNOSIS — F41.1 GAD (GENERALIZED ANXIETY DISORDER): ICD-10-CM

## 2022-10-25 DIAGNOSIS — R22.1 NECK MASS: Primary | ICD-10-CM

## 2022-10-25 PROCEDURE — 99214 OFFICE O/P EST MOD 30 MIN: CPT | Performed by: FAMILY MEDICINE

## 2022-10-25 RX ORDER — LORAZEPAM 0.5 MG/1
0.5 TABLET ORAL 2 TIMES DAILY PRN
Qty: 60 TABLET | Refills: 2 | Status: SHIPPED | OUTPATIENT
Start: 2022-10-25 | End: 2022-11-24

## 2022-10-25 RX ORDER — AMOXICILLIN 500 MG/1
500 CAPSULE ORAL 3 TIMES DAILY
Qty: 30 CAPSULE | Refills: 0 | Status: SHIPPED | OUTPATIENT
Start: 2022-10-25 | End: 2022-10-25 | Stop reason: SDUPTHER

## 2022-10-25 RX ORDER — LORAZEPAM 0.5 MG/1
0.5 TABLET ORAL 2 TIMES DAILY PRN
Qty: 60 TABLET | Refills: 2 | Status: SHIPPED | OUTPATIENT
Start: 2022-10-25 | End: 2022-10-25 | Stop reason: SDUPTHER

## 2022-10-25 RX ORDER — AMOXICILLIN 500 MG/1
500 CAPSULE ORAL 3 TIMES DAILY
Qty: 30 CAPSULE | Refills: 0 | Status: SHIPPED | OUTPATIENT
Start: 2022-10-25 | End: 2022-11-04

## 2022-10-25 ASSESSMENT — ENCOUNTER SYMPTOMS: RESPIRATORY NEGATIVE: 1

## 2022-10-25 NOTE — PROGRESS NOTES
Antwon Mccartney (:  1995) is a 32 y.o. female,Established patient, here for evaluation of the following chief complaint(s): Anxiety (Possible ADHD) and Mass (On back of neck still an issue)         ASSESSMENT/PLAN:  1. Neck mass  -     CT SOFT TISSUE NECK WO CONTRAST; Future  2. NEENA (generalized anxiety disorder)  -     LORazepam (ATIVAN) 0.5 MG tablet; Take 1 tablet by mouth 2 times daily as needed for Anxiety for up to 30 days. , Disp-60 tablet, R-2Normal      Return if symptoms worsen or fail to improve. Subjective   SUBJECTIVE/OBJECTIVE:  Anxiety  Symptoms include decreased concentration and nervous/anxious behavior. Mass  Associated symptoms include fatigue and myalgias. Mass neck  Has been there for a few years  Gets bigger and smaller  Tender  No other masses felt     Anxiety  She is concerned about possible add  She did not do well in school   struggles with reading and staying on task  She is never been tested   She gets very overwhelmed  procrastination    Review of Systems   Constitutional:  Positive for activity change and fatigue. Respiratory: Negative. Cardiovascular: Negative. Musculoskeletal:  Positive for myalgias. Skin: Negative. Psychiatric/Behavioral:  Positive for agitation, decreased concentration and sleep disturbance. The patient is nervous/anxious. Objective   Physical Exam  Vitals and nursing note reviewed. Constitutional:       Appearance: She is well-developed. HENT:      Head: Normocephalic. Neck:      Thyroid: No thyromegaly. Cardiovascular:      Rate and Rhythm: Normal rate and regular rhythm. Heart sounds: Normal heart sounds. Pulmonary:      Effort: Pulmonary effort is normal.      Breath sounds: Normal breath sounds. Lymphadenopathy:      Cervical: Cervical adenopathy (left posterior node) present. Neurological:      Mental Status: She is alert and oriented to person, place, and time.    Psychiatric:         Behavior: Behavior normal.         Thought Content: Thought content normal.         Judgment: Judgment normal.                An electronic signature was used to authenticate this note.     --Latasha Pinto, DO

## 2022-10-25 NOTE — TELEPHONE ENCOUNTER
From: Brianna Andrade  To: Dr. Figueroa Mew: 10/25/2022 12:38 PM EDT  Subject: Ear pain    Hello,    I was in this morning and one of the issues we discussed was the lump on the side of my neck which usually swells a little when Im sick. Pretty soon after I left my inner left ear started aching really bad which would make sense why that lump was swelling if theres an inner ear infection. Is there anyway I could get antibiotics sent to a NEW pharmacy for this?  I want to get it treated before I gets worse

## 2022-10-25 NOTE — TELEPHONE ENCOUNTER
amoxicillin (AMOXIL) 500 MG capsule  LORazepam (ATIVAN) 0.5 MG tablet    Pt want prescriptions sent to    [address], [address] [facility] - P [phone] - F [phone]

## 2022-11-15 ENCOUNTER — HOSPITAL ENCOUNTER (OUTPATIENT)
Dept: CT IMAGING | Age: 27
Discharge: HOME | End: 2022-11-15
Payer: COMMERCIAL

## 2022-11-15 DIAGNOSIS — R22.1 NECK MASS: ICD-10-CM

## 2022-11-15 PROCEDURE — 70490 CT SOFT TISSUE NECK W/O DYE: CPT

## 2023-01-18 ENCOUNTER — OFFICE VISIT (OUTPATIENT)
Dept: FAMILY MEDICINE CLINIC | Age: 28
End: 2023-01-18

## 2023-01-18 VITALS
WEIGHT: 170 LBS | BODY MASS INDEX: 26.68 KG/M2 | SYSTOLIC BLOOD PRESSURE: 136 MMHG | DIASTOLIC BLOOD PRESSURE: 80 MMHG | HEIGHT: 67 IN

## 2023-01-18 DIAGNOSIS — L50.9 HIVES: Primary | ICD-10-CM

## 2023-01-18 LAB
A/G RATIO: 1.3 (ref 1.1–2.2)
ALBUMIN SERPL-MCNC: 4.2 G/DL (ref 3.4–5)
ALP BLD-CCNC: 75 U/L (ref 40–129)
ALT SERPL-CCNC: 10 U/L (ref 10–40)
ANION GAP SERPL CALCULATED.3IONS-SCNC: 10 MMOL/L (ref 3–16)
AST SERPL-CCNC: 16 U/L (ref 15–37)
BILIRUB SERPL-MCNC: <0.2 MG/DL (ref 0–1)
BUN BLDV-MCNC: 15 MG/DL (ref 7–20)
C-REACTIVE PROTEIN: 13.7 MG/L (ref 0–5.1)
CALCIUM SERPL-MCNC: 9.5 MG/DL (ref 8.3–10.6)
CHLORIDE BLD-SCNC: 102 MMOL/L (ref 99–110)
CO2: 25 MMOL/L (ref 21–32)
CREAT SERPL-MCNC: 0.7 MG/DL (ref 0.6–1.1)
GFR SERPL CREATININE-BSD FRML MDRD: >60 ML/MIN/{1.73_M2}
GLUCOSE BLD-MCNC: 105 MG/DL (ref 70–99)
HCT VFR BLD CALC: 42.8 % (ref 36–48)
HEMOGLOBIN: 14.2 G/DL (ref 12–16)
MCH RBC QN AUTO: 29.8 PG (ref 26–34)
MCHC RBC AUTO-ENTMCNC: 33.1 G/DL (ref 31–36)
MCV RBC AUTO: 89.8 FL (ref 80–100)
PDW BLD-RTO: 13 % (ref 12.4–15.4)
PLATELET # BLD: 258 K/UL (ref 135–450)
PMV BLD AUTO: 9.5 FL (ref 5–10.5)
POTASSIUM SERPL-SCNC: 4.8 MMOL/L (ref 3.5–5.1)
RBC # BLD: 4.77 M/UL (ref 4–5.2)
SEDIMENTATION RATE, ERYTHROCYTE: 23 MM/HR (ref 0–20)
SODIUM BLD-SCNC: 137 MMOL/L (ref 136–145)
TOTAL PROTEIN: 7.4 G/DL (ref 6.4–8.2)
WBC # BLD: 5.6 K/UL (ref 4–11)

## 2023-01-18 RX ORDER — NADOLOL 40 MG/1
TABLET ORAL
COMMUNITY
Start: 2022-11-01

## 2023-01-18 SDOH — ECONOMIC STABILITY: FOOD INSECURITY: WITHIN THE PAST 12 MONTHS, YOU WORRIED THAT YOUR FOOD WOULD RUN OUT BEFORE YOU GOT MONEY TO BUY MORE.: NEVER TRUE

## 2023-01-18 SDOH — ECONOMIC STABILITY: FOOD INSECURITY: WITHIN THE PAST 12 MONTHS, THE FOOD YOU BOUGHT JUST DIDN'T LAST AND YOU DIDN'T HAVE MONEY TO GET MORE.: NEVER TRUE

## 2023-01-18 ASSESSMENT — PATIENT HEALTH QUESTIONNAIRE - PHQ9
SUM OF ALL RESPONSES TO PHQ QUESTIONS 1-9: 0
1. LITTLE INTEREST OR PLEASURE IN DOING THINGS: 0
SUM OF ALL RESPONSES TO PHQ9 QUESTIONS 1 & 2: 0
SUM OF ALL RESPONSES TO PHQ QUESTIONS 1-9: 0
2. FEELING DOWN, DEPRESSED OR HOPELESS: 0

## 2023-01-18 ASSESSMENT — ENCOUNTER SYMPTOMS
GASTROINTESTINAL NEGATIVE: 1
RESPIRATORY NEGATIVE: 1

## 2023-01-18 ASSESSMENT — SOCIAL DETERMINANTS OF HEALTH (SDOH): HOW HARD IS IT FOR YOU TO PAY FOR THE VERY BASICS LIKE FOOD, HOUSING, MEDICAL CARE, AND HEATING?: NOT HARD AT ALL

## 2023-01-18 NOTE — PROGRESS NOTES
David Munoz (:  1995) is a 32 y.o. female,Established patient, here for evaluation of the following chief complaint(s):  Rash (X6 months/) and Skin Problem         ASSESSMENT/PLAN:  1. Hives  -     CBC  -     Comprehensive Metabolic Panel  -     CURTIS Reflex to Antibody Cascade  -     Sedimentation Rate  -     C-Reactive Protein               Subjective   SUBJECTIVE/OBJECTIVE:  Rash  This is a recurrent problem. The current episode started 1 to 4 weeks ago. The problem has been waxing and waning since onset. The rash is diffuse. The rash is characterized by itchiness and redness. She was exposed to nothing. Past treatments include antihistamine and anti-itch cream. The treatment provided mild relief. Skin Problem      Review of Systems   Constitutional: Negative. HENT: Negative. Respiratory: Negative. Cardiovascular: Negative. Gastrointestinal: Negative. Skin:  Positive for rash. Neurological: Negative. Psychiatric/Behavioral:  Positive for decreased concentration. Objective   Physical Exam  Vitals and nursing note reviewed. Constitutional:       Appearance: She is well-developed. HENT:      Head: Normocephalic. Neck:      Thyroid: No thyromegaly. Cardiovascular:      Rate and Rhythm: Normal rate and regular rhythm. Heart sounds: Normal heart sounds. Pulmonary:      Effort: Pulmonary effort is normal.      Breath sounds: Normal breath sounds. Lymphadenopathy:      Cervical: No cervical adenopathy. Neurological:      Mental Status: She is alert and oriented to person, place, and time. Psychiatric:         Behavior: Behavior normal.         Thought Content: Thought content normal.         Judgment: Judgment normal.                An electronic signature was used to authenticate this note.     --Shaniqua Carolina, DO

## 2023-01-19 LAB — ANTI-NUCLEAR ANTIBODY (ANA): NEGATIVE

## 2023-01-20 RX ORDER — NORELGESTROMIN AND ETHINYL ESTRADIOL 150; 35 UG/D; UG/D
PATCH TRANSDERMAL
Qty: 9 PATCH | Refills: 1 | Status: SHIPPED | OUTPATIENT
Start: 2023-01-20

## 2023-02-04 RX ORDER — NORELGESTROMIN AND ETHINYL ESTRADIOL 150; 35 UG/D; UG/D
PATCH TRANSDERMAL
Qty: 9 PATCH | Refills: 1 | Status: SHIPPED | OUTPATIENT
Start: 2023-02-04

## 2023-03-03 ENCOUNTER — OFFICE VISIT (OUTPATIENT)
Dept: FAMILY MEDICINE CLINIC | Age: 28
End: 2023-03-03

## 2023-03-03 VITALS
HEIGHT: 67 IN | DIASTOLIC BLOOD PRESSURE: 70 MMHG | WEIGHT: 176 LBS | BODY MASS INDEX: 27.62 KG/M2 | SYSTOLIC BLOOD PRESSURE: 120 MMHG

## 2023-03-03 DIAGNOSIS — M25.50 POLYARTHRALGIA: Primary | ICD-10-CM

## 2023-03-03 LAB
C-REACTIVE PROTEIN: 7.8 MG/L (ref 0–5.1)
SEDIMENTATION RATE, ERYTHROCYTE: 7 MM/HR (ref 0–20)

## 2023-03-03 RX ORDER — NORELGESTROMIN AND ETHINYL ESTRADIOL 150; 35 UG/D; UG/D
PATCH TRANSDERMAL
Qty: 9 PATCH | Refills: 1 | Status: CANCELLED | OUTPATIENT
Start: 2023-03-03

## 2023-03-03 RX ORDER — LORAZEPAM 0.5 MG/1
TABLET ORAL
COMMUNITY

## 2023-03-03 ASSESSMENT — ENCOUNTER SYMPTOMS
RESPIRATORY NEGATIVE: 1
GASTROINTESTINAL NEGATIVE: 1

## 2023-03-03 NOTE — PROGRESS NOTES
Subjective:      Patient ID: Selina Hernandez is a 32 y.o. female. HPI  Polyarthralgia  She is here to have her inflammatory markers rechecked  She is doing ok  Still with some pain    Review of Systems   Constitutional: Negative. HENT: Negative. Respiratory: Negative. Cardiovascular: Negative. Gastrointestinal: Negative. Neurological: Negative. Psychiatric/Behavioral:  Positive for decreased concentration. YOB: 1995    Date of Visit:  3/3/2023    Allergies   Allergen Reactions    Metronidazole Other (See Comments)     Tachycardia, shaking, weakness    Prochlorperazine Anxiety       Outpatient Medications Marked as Taking for the 3/3/23 encounter (Office Visit) with Paulino Carrasco, DO   Medication Sig Dispense Refill    LORazepam (ATIVAN) 0.5 MG tablet 1 tab(s)      norelgestromin-ethinyl estradiol (XULANE) 150-35 MCG/24HR APPLY 1 PATCH ONCE A WEEK 9 patch 1    nadolol (CORGARD) 40 MG tablet       ibuprofen (ADVIL;MOTRIN) 800 MG tablet Take 1 tablet by mouth every 8 hours as needed for Pain 60 tablet 3       Vitals:    03/03/23 0756   BP: 120/70   Weight: 176 lb (79.8 kg)   Height: 5' 7\" (1.702 m)     Body mass index is 27.57 kg/m². Wt Readings from Last 3 Encounters:   03/03/23 176 lb (79.8 kg)   01/18/23 170 lb (77.1 kg)   10/25/22 165 lb (74.8 kg)     BP Readings from Last 3 Encounters:   03/03/23 120/70   01/18/23 136/80   10/25/22 124/88       Objective:   Physical Exam  Constitutional:       General: She is not in acute distress. Appearance: She is well-developed. HENT:      Head: Normocephalic. Neurological:      Mental Status: She is alert and oriented to person, place, and time. Psychiatric:         Behavior: Behavior normal.         Thought Content: Thought content normal.         Judgment: Judgment normal.       Assessment:            Plan:      Assessment/plan;  Mona Oviedo was seen today for blood work.     Diagnoses and all orders for this visit:    Polyarthralgia  -     Sedimentation Rate  -     C-Reactive Protein    Await blood work results  No follow-ups on file.          JAZMINE JASMINE DO

## 2023-05-30 ENCOUNTER — HOSPITAL ENCOUNTER (EMERGENCY)
Age: 28
Discharge: HOME | End: 2023-05-30
Payer: COMMERCIAL

## 2023-05-30 ENCOUNTER — APPOINTMENT (OUTPATIENT)
Dept: GENERAL RADIOLOGY | Age: 28
End: 2023-05-30
Payer: COMMERCIAL

## 2023-05-30 VITALS
HEART RATE: 85 BPM | WEIGHT: 180 LBS | OXYGEN SATURATION: 100 % | HEIGHT: 67 IN | RESPIRATION RATE: 18 BRPM | TEMPERATURE: 98.4 F | SYSTOLIC BLOOD PRESSURE: 150 MMHG | DIASTOLIC BLOOD PRESSURE: 89 MMHG | BODY MASS INDEX: 28.25 KG/M2

## 2023-05-30 DIAGNOSIS — S89.92XA INJURY OF LEFT KNEE, INITIAL ENCOUNTER: Primary | ICD-10-CM

## 2023-05-30 PROCEDURE — 6370000000 HC RX 637 (ALT 250 FOR IP): Performed by: PHYSICIAN ASSISTANT

## 2023-05-30 PROCEDURE — 99283 EMERGENCY DEPT VISIT LOW MDM: CPT

## 2023-05-30 PROCEDURE — 73562 X-RAY EXAM OF KNEE 3: CPT

## 2023-05-30 RX ORDER — NAPROXEN 250 MG/1
500 TABLET ORAL ONCE
Status: COMPLETED | OUTPATIENT
Start: 2023-05-30 | End: 2023-05-30

## 2023-05-30 RX ORDER — NAPROXEN 500 MG/1
500 TABLET ORAL 2 TIMES DAILY WITH MEALS
Qty: 30 TABLET | Refills: 0 | Status: SHIPPED | OUTPATIENT
Start: 2023-05-30

## 2023-05-30 RX ORDER — ACETAMINOPHEN 500 MG
1000 TABLET ORAL ONCE
Status: COMPLETED | OUTPATIENT
Start: 2023-05-30 | End: 2023-05-30

## 2023-05-30 RX ADMIN — ACETAMINOPHEN 1000 MG: 500 TABLET ORAL at 09:04

## 2023-05-30 RX ADMIN — NAPROXEN 500 MG: 250 TABLET ORAL at 09:04

## 2023-05-30 ASSESSMENT — ENCOUNTER SYMPTOMS
COLOR CHANGE: 0
COUGH: 0
ABDOMINAL PAIN: 0
RESPIRATORY NEGATIVE: 1
NAUSEA: 0
SHORTNESS OF BREATH: 0
VOMITING: 0
BACK PAIN: 0

## 2023-05-30 ASSESSMENT — PAIN DESCRIPTION - PAIN TYPE: TYPE: ACUTE PAIN

## 2023-05-30 ASSESSMENT — PAIN - FUNCTIONAL ASSESSMENT: PAIN_FUNCTIONAL_ASSESSMENT: 0-10

## 2023-05-30 ASSESSMENT — PAIN DESCRIPTION - ORIENTATION: ORIENTATION: LEFT

## 2023-05-30 ASSESSMENT — PAIN SCALES - GENERAL: PAINLEVEL_OUTOF10: 8

## 2023-05-30 ASSESSMENT — PAIN DESCRIPTION - DESCRIPTORS: DESCRIPTORS: SHARP

## 2023-05-30 ASSESSMENT — PAIN DESCRIPTION - LOCATION: LOCATION: KNEE

## 2023-05-30 ASSESSMENT — LIFESTYLE VARIABLES: HOW OFTEN DO YOU HAVE A DRINK CONTAINING ALCOHOL: NEVER

## 2023-05-30 NOTE — ED PROVIDER NOTES
[facility]        Pt Name: Roney Vergara  MRN: [id number]  [place] 1995  Date of evaluation: 5/30/2023  Provider: ALLAN Estrada  PCP: Michel Barker DO  Note Started: 10:48 AM EDT 5/30/23      REGULO. I have evaluated this patient. CHIEF COMPLAINT       Chief Complaint   Patient presents with    Knee Injury     Arrived per friend d/t twisting of left knee while attempting to put Jeep top on that occurred this am       HISTORY OF PRESENT ILLNESS: 1 or more Elements     History From: Patient  Limitations to history : None    Roney Vergara is a 29 y.o. female with no significant past medical history who presents ED with complaint of a left knee injury. Patient arrives by friend with complaint of a left knee injury this morning. Patient states she was putting the top on her jeep because she thought it was going to rain. Patient states a vent in her garage went off and states it scared her. She states her left foot got caught in the rail on her jeep and she fell. States she twisted her left knee. States she heard a pop. Patient states since then has had inability to ambulate on the left lower extremity secondary to pain. She states has not been able to bear weight to the left lower extremity. She reports associated edema. Reports decreased range of motion and strength. Denies ecchymosis, erythema or warmth. Denies fever chills. Denies numbness or tingling. Denies any other injury or trauma throughout. Specifically denies any head injury or neck/back injury. She denies previous injury or trauma to the left knee in the past.  Denies taking any over-the-counter medication for symptom control. Rates pain as an 8/10. Became concerned and came to the ED for further evaluation and treatment. Nursing Notes were all reviewed and agreed with or any disagreements were addressed in the HPI.     REVIEW OF SYSTEMS :

## 2023-06-01 ENCOUNTER — OFFICE VISIT (OUTPATIENT)
Dept: ORTHOPEDIC SURGERY | Age: 28
End: 2023-06-01

## 2023-06-01 ENCOUNTER — TELEPHONE (OUTPATIENT)
Dept: ORTHOPEDIC SURGERY | Age: 28
End: 2023-06-01

## 2023-06-01 VITALS — WEIGHT: 180 LBS | BODY MASS INDEX: 28.25 KG/M2 | HEIGHT: 67 IN

## 2023-06-01 DIAGNOSIS — M25.562 ACUTE PAIN OF LEFT KNEE: Primary | ICD-10-CM

## 2023-06-01 DIAGNOSIS — S83.412A SPRAIN OF MEDIAL COLLATERAL LIGAMENT OF LEFT KNEE, INITIAL ENCOUNTER: ICD-10-CM

## 2023-06-01 SDOH — HEALTH STABILITY: PHYSICAL HEALTH: ON AVERAGE, HOW MANY MINUTES DO YOU ENGAGE IN EXERCISE AT THIS LEVEL?: 40 MIN

## 2023-06-01 SDOH — HEALTH STABILITY: PHYSICAL HEALTH: ON AVERAGE, HOW MANY DAYS PER WEEK DO YOU ENGAGE IN MODERATE TO STRENUOUS EXERCISE (LIKE A BRISK WALK)?: 4 DAYS

## 2023-06-01 ASSESSMENT — SOCIAL DETERMINANTS OF HEALTH (SDOH)
WITHIN THE LAST YEAR, HAVE YOU BEEN KICKED, HIT, SLAPPED, OR OTHERWISE PHYSICALLY HURT BY YOUR PARTNER OR EX-PARTNER?: NO
WITHIN THE LAST YEAR, HAVE YOU BEEN AFRAID OF YOUR PARTNER OR EX-PARTNER?: NO
WITHIN THE LAST YEAR, HAVE YOU BEEN HUMILIATED OR EMOTIONALLY ABUSED IN OTHER WAYS BY YOUR PARTNER OR EX-PARTNER?: NO
WITHIN THE LAST YEAR, HAVE TO BEEN RAPED OR FORCED TO HAVE ANY KIND OF SEXUAL ACTIVITY BY YOUR PARTNER OR EX-PARTNER?: NO

## 2023-06-01 NOTE — PROGRESS NOTES
Cee Tran  [id number]  June 1, 2023    Chief Complaint   Patient presents with    Knee Pain     Left       History: The patient is a 43-year-old female who is here for evaluation of her left knee. She reportedly was stepping off the rail of her jeep and her left foot was caught in the rail. She then severely twisted the left knee. She immediately noted left knee pain and swelling. The patient does have a very remote history of a medial collateral ligament sprain in this left knee. She recovered uneventfully. The injury occurred on 5/30. She ultimately presented to the emergency room that day and was given a knee immobilizer. This is a consult from ALLAN Ortez for left knee pain and swelling. The patient's  past medical history, medications, allergies,  family history, social history, and have been reviewed, and dated and are recorded in the chart. Pertinent items are noted in HPI. Review of systems reviewed from Pertinent History Form dated on 6/1 and available in the patient's chart under the Media tab. Vitals:  Ht 5' 7\" (1.702 m)   Wt 180 lb (81.6 kg)   LMP 05/31/2023   BMI 28.19 kg/m²     Physical: Ms. Cee Tran appears well, she is in no apparent distress, she demonstrates appropriate mood & affect. He is alert and oriented to person, place and time. Examination of the left knee reveals medial joint line tenderness. There is a medium effusion. Claire's is positive medially. Range of motion is from -5 degrees extension to 85 degrees of flexion. Range of motion of the opposite knee is full. Anterior drawer and Lachman are negative. Patient does have a slight laxity with valgus stressing of the left knee. She has moderate pain with valgus stressing of the left knee. There is no pain with range of motion of the hips. Sensation is intact to light touch in the tibial, peroneal, sural, and saphenous nerve distributions bilaterally.   Both feet are well perfused and sensory is

## 2023-06-01 NOTE — TELEPHONE ENCOUNTER
Received insurance approval for MRI to be scheduled at [facility]. Patient may call [phone] to schedule. Once scheduled, patient should call our office back to make a follow-up appointment to go over the results. I spoke to pt and let her know.

## 2023-06-01 NOTE — TELEPHONE ENCOUNTER
General Question     Subject: PATIENT STATES HER INSURANCE DOES NOT REQUIRE PRE AUTHORIZATION TO GET MRI. PLEASE ADVISE.   Patient Dariana Gutierrez Number: [phone]

## 2023-06-03 RX ORDER — NORELGESTROMIN AND ETHINYL ESTRADIOL 150; 35 UG/D; UG/D
PATCH TRANSDERMAL
Qty: 9 PATCH | Refills: 1 | Status: SHIPPED | OUTPATIENT
Start: 2023-06-03

## 2023-06-05 ENCOUNTER — TELEPHONE (OUTPATIENT)
Dept: ORTHOPEDIC SURGERY | Age: 28
End: 2023-06-05

## 2023-06-05 NOTE — TELEPHONE ENCOUNTER
General Question     Subject: LEAVE OF ABSENCE FORM  Patient and /or Facility Request: Samuel Nash  Contact Number: [phone]      THE PT CALLED TO LET THE OFFICE KNOW THAT SHE IS FAXING OVER A LEAVE OF ABSENCE FORM THAT NEEDS FILLED OUT.

## 2023-06-08 ENCOUNTER — HOSPITAL ENCOUNTER (OUTPATIENT)
Dept: MRI IMAGING | Age: 28
Discharge: HOME | End: 2023-06-08
Attending: ORTHOPAEDIC SURGERY
Payer: COMMERCIAL

## 2023-06-08 DIAGNOSIS — M25.562 ACUTE PAIN OF LEFT KNEE: ICD-10-CM

## 2023-06-08 DIAGNOSIS — S83.412A SPRAIN OF MEDIAL COLLATERAL LIGAMENT OF LEFT KNEE, INITIAL ENCOUNTER: ICD-10-CM

## 2023-06-08 PROCEDURE — 73721 MRI JNT OF LWR EXTRE W/O DYE: CPT

## 2023-06-29 ENCOUNTER — TELEPHONE (OUTPATIENT)
Dept: ORTHOPEDIC SURGERY | Age: 28
End: 2023-06-29

## 2023-07-18 ENCOUNTER — TELEPHONE (OUTPATIENT)
Dept: ORTHOPEDIC SURGERY | Age: 28
End: 2023-07-18

## 2023-07-20 ENCOUNTER — TELEPHONE (OUTPATIENT)
Dept: ORTHOPEDIC SURGERY | Age: 28
End: 2023-07-20

## 2023-07-26 RX ORDER — NORELGESTROMIN AND ETHINYL ESTRADIOL 150; 35 UG/D; UG/D
PATCH TRANSDERMAL
Qty: 3 PATCH | Refills: 5 | Status: SHIPPED | OUTPATIENT
Start: 2023-07-26

## 2023-09-06 ENCOUNTER — TELEPHONE (OUTPATIENT)
Dept: FAMILY MEDICINE CLINIC | Age: 28
End: 2023-09-06

## 2023-09-06 NOTE — TELEPHONE ENCOUNTER
----- Message from Leonel Landon sent at 9/6/2023 12:42 PM EDT -----  Subject: Appointment Request    Reason for Call: Established Patient Appointment needed: Routine ED Follow   Up Visit    QUESTIONS    Reason for appointment request? No appointments available during search     Additional Information for Provider? Patient seen at [facility] in   [facility] ED for abdominal pain and back pain. was informed to   follow up with PCP and get an adrenal gland study scheduled ASAP.  Clinical   staff found cyst on adrenal gland.  ---------------------------------------------------------------------------  --------------  Pretty LENZ  [phone]; OK to leave message on voicemail  ---------------------------------------------------------------------------  --------------  SCRIPT ANSWERS

## 2023-09-07 NOTE — TELEPHONE ENCOUNTER
Spoke with patient and she has already scheduled a VV for Monday 9/11. She would like to keep this visit and discuss next steps with Dr. Laine Jacinto. Visit confirmed and notes modified.

## 2023-09-11 ENCOUNTER — TELEMEDICINE (OUTPATIENT)
Dept: FAMILY MEDICINE CLINIC | Age: 28
End: 2023-09-11
Payer: COMMERCIAL

## 2023-09-11 DIAGNOSIS — F41.1 GAD (GENERALIZED ANXIETY DISORDER): ICD-10-CM

## 2023-09-11 PROCEDURE — 99213 OFFICE O/P EST LOW 20 MIN: CPT | Performed by: FAMILY MEDICINE

## 2023-09-11 RX ORDER — LORAZEPAM 0.5 MG/1
0.5 TABLET ORAL 2 TIMES DAILY PRN
Qty: 60 TABLET | Refills: 0 | Status: SHIPPED | OUTPATIENT
Start: 2023-09-11 | End: 2023-10-11

## 2023-09-11 ASSESSMENT — ENCOUNTER SYMPTOMS
RESPIRATORY NEGATIVE: 1
GASTROINTESTINAL NEGATIVE: 1

## 2023-09-11 NOTE — PROGRESS NOTES
Cassi Ibrahim, was evaluated through a synchronous (real-time) audio-video encounter. The patient (or guardian if applicable) is aware that this is a billable service, which includes applicable co-pays. This Virtual Visit was conducted with patient's (and/or legal guardian's) consent. Patient identification was verified, and a caregiver was present when appropriate. The patient was located at Other: pt at work  [place], [place]  Provider was located at office [address] (:  1995) is a Established patient, presenting virtually for evaluation of the following:    Assessment & Plan   Below is the assessment and plan developed based on review of pertinent history, physical exam, labs, studies, and medications. 1. NEENA (generalized anxiety disorder)  -     LORazepam (ATIVAN) 0.5 MG tablet; Take 1 tablet by mouth 2 times daily as needed for Anxiety for up to 30 days. , Disp-60 tablet, R-0Normal  Discussed with pt ct findings from recent ER trip   Adenoma on right   recommended ct to further evaluate   previous ct found from [facility]  with same adenoma  no further workup needed  Return in about 3 months (around 2023). 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 HENT: Negative. Respiratory: Negative. Cardiovascular: Negative. Gastrointestinal: Negative. Neurological: Negative. Psychiatric/Behavioral:  Positive for decreased concentration. Objective   Patient-Reported Vitals  No data recorded     Physical Exam  Constitutional:       General: She is not in acute distress. Appearance: She is well-developed. HENT:      Head: Normocephalic. Neurological:      Mental Status: She is alert and oriented to person, place, and time.    Psychiatric:         Behavior:

## 2023-09-20 ENCOUNTER — OFFICE VISIT (OUTPATIENT)
Dept: FAMILY MEDICINE CLINIC | Age: 28
End: 2023-09-20
Payer: COMMERCIAL

## 2023-09-20 VITALS
DIASTOLIC BLOOD PRESSURE: 76 MMHG | HEIGHT: 67 IN | BODY MASS INDEX: 28.35 KG/M2 | WEIGHT: 180.6 LBS | SYSTOLIC BLOOD PRESSURE: 124 MMHG

## 2023-09-20 DIAGNOSIS — K38.8 APPENDICULAR MUCOCELE: ICD-10-CM

## 2023-09-20 DIAGNOSIS — Z01.818 PREOP EXAMINATION: Primary | ICD-10-CM

## 2023-09-20 DIAGNOSIS — R00.0 INAPPROPRIATE SINUS NODE TACHYCARDIA: ICD-10-CM

## 2023-09-20 PROCEDURE — 3074F SYST BP LT 130 MM HG: CPT | Performed by: FAMILY MEDICINE

## 2023-09-20 PROCEDURE — 3078F DIAST BP <80 MM HG: CPT | Performed by: FAMILY MEDICINE

## 2023-09-20 PROCEDURE — 99214 OFFICE O/P EST MOD 30 MIN: CPT | Performed by: FAMILY MEDICINE

## 2023-09-20 ASSESSMENT — ENCOUNTER SYMPTOMS
RESPIRATORY NEGATIVE: 1
ABDOMINAL PAIN: 1

## 2023-11-21 ENCOUNTER — TELEMEDICINE (OUTPATIENT)
Dept: FAMILY MEDICINE CLINIC | Age: 28
End: 2023-11-21
Payer: COMMERCIAL

## 2023-11-21 DIAGNOSIS — J01.90 ACUTE BACTERIAL SINUSITIS: Primary | ICD-10-CM

## 2023-11-21 DIAGNOSIS — B96.89 ACUTE BACTERIAL SINUSITIS: Primary | ICD-10-CM

## 2023-11-21 PROCEDURE — 99213 OFFICE O/P EST LOW 20 MIN: CPT | Performed by: FAMILY MEDICINE

## 2023-11-21 RX ORDER — BENZONATATE 200 MG/1
200 CAPSULE ORAL 3 TIMES DAILY PRN
Qty: 30 CAPSULE | Refills: 0 | Status: SHIPPED | OUTPATIENT
Start: 2023-11-21 | End: 2023-12-01

## 2023-11-21 RX ORDER — AMOXICILLIN 875 MG/1
875 TABLET, COATED ORAL 2 TIMES DAILY
Qty: 20 TABLET | Refills: 0 | Status: SHIPPED | OUTPATIENT
Start: 2023-11-21 | End: 2023-12-01

## 2023-11-21 ASSESSMENT — ENCOUNTER SYMPTOMS
COUGH: 1
RHINORRHEA: 1
SINUS PRESSURE: 1
SINUS PAIN: 1

## 2023-11-22 NOTE — PROGRESS NOTES
Garrett Boo, was evaluated through a synchronous (real-time) audio-video encounter. The patient (or guardian if applicable) is aware that this is a billable service, which includes applicable co-pays. This Virtual Visit was conducted with patient's (and/or legal guardian's) consent. Patient identification was verified, and a caregiver was present when appropriate. The patient was located at Home: [address]  Provider was located at [facility] (Appt Dept): [address] [facility] [address],  [address] [facility] [address] [facility]      Garrett Boo (:  1995) is a Established patient, presenting virtually for evaluation of the following:    Assessment & Plan   Below is the assessment and plan developed based on review of pertinent history, physical exam, labs, studies, and medications. 1. Acute bacterial sinusitis  -     benzonatate (TESSALON) 200 MG capsule; Take 1 capsule by mouth 3 times daily as needed for Cough, Disp-30 capsule, R-0Normal  -     amoxicillin (AMOXIL) 875 MG tablet; Take 1 tablet by mouth 2 times daily for 10 days, Disp-20 tablet, R-0Normal    No follow-ups on file. Subjective   HPI  Sinusitis  She has pressure and drainage  Has been on amoxil for her dental abscess  Having root canal tomorrow  Colored drainage  Coughing   went to urgent care   Review of Systems   Constitutional:  Positive for activity change and fatigue. HENT:  Positive for congestion, dental problem, postnasal drip, rhinorrhea, sinus pressure and sinus pain. Respiratory:  Positive for cough. Neurological: Negative. Objective   Patient-Reported Vitals  No data recorded     Physical Exam  Constitutional:       Appearance: Normal appearance. HENT:      Head: Normocephalic. Neurological:      Mental Status: She is alert. Psychiatric:         Mood and Affect: Mood normal.         Behavior: Behavior normal.         Thought Content:  Thought content normal.                  --JAZMINE

## 2023-12-05 RX ORDER — NORELGESTROMIN AND ETHINYL ESTRADIOL 150; 35 UG/D; UG/D
PATCH TRANSDERMAL
Qty: 3 PATCH | Refills: 5 | Status: SHIPPED | OUTPATIENT
Start: 2023-12-05

## 2024-01-18 RX ORDER — NORELGESTROMIN AND ETHINYL ESTRADIOL 150; 35 UG/D; UG/D
PATCH TRANSDERMAL
Qty: 3 PATCH | Refills: 5 | OUTPATIENT
Start: 2024-01-18

## 2024-01-25 RX ORDER — NORELGESTROMIN AND ETHINYL ESTRADIOL 150; 35 UG/D; UG/D
PATCH TRANSDERMAL
Qty: 3 PATCH | Refills: 5 | Status: SHIPPED | OUTPATIENT
Start: 2024-01-25

## 2024-04-19 NOTE — TELEPHONE ENCOUNTER
Have her symptoms resolved? The pt is alert, interactive and pleasant. Her receptive language abilities were preserved and she was able to verbalize during communicative probes without evidence of a primary speech-language pathology. Pt is communicatively competent and reportedly at speech-language baseline.

## 2024-05-31 ENCOUNTER — OFFICE VISIT (OUTPATIENT)
Dept: FAMILY MEDICINE CLINIC | Age: 29
End: 2024-05-31
Payer: COMMERCIAL

## 2024-05-31 VITALS
BODY MASS INDEX: 28.56 KG/M2 | SYSTOLIC BLOOD PRESSURE: 124 MMHG | WEIGHT: 182 LBS | DIASTOLIC BLOOD PRESSURE: 76 MMHG | HEIGHT: 67 IN

## 2024-05-31 DIAGNOSIS — E34.9 HORMONE IMBALANCE: Primary | ICD-10-CM

## 2024-05-31 PROCEDURE — 99213 OFFICE O/P EST LOW 20 MIN: CPT | Performed by: FAMILY MEDICINE

## 2024-05-31 PROCEDURE — 3074F SYST BP LT 130 MM HG: CPT | Performed by: FAMILY MEDICINE

## 2024-05-31 PROCEDURE — 3078F DIAST BP <80 MM HG: CPT | Performed by: FAMILY MEDICINE

## 2024-05-31 ASSESSMENT — ENCOUNTER SYMPTOMS
GASTROINTESTINAL NEGATIVE: 1
RESPIRATORY NEGATIVE: 1

## 2024-05-31 NOTE — PROGRESS NOTES
OUTPATIENT PROGRESS NOTE  Date of Service:  5/31/2024  Address: [facility]  [facility]  [address]  [address]  [place] [facility] [address]  Dept: [phone]  Loc: [phone]    Subjective:      Patient ID:  [id number]  Marcos Rosales is a 29 y.o. female     HPI  Hormone imbalance  She is on hormone patch now  Has never felt like her hormones were normal  Acne  irreg periods  She gets ingrown hair in axilla and groin  down now  Worried she has lymphoma or something serious     Review of Systems   Constitutional: Negative.    Respiratory: Negative.     Cardiovascular: Negative.    Gastrointestinal: Negative.    Genitourinary:  Positive for menstrual problem.   Skin: Negative.    Neurological: Negative.        Objective:   YOB: 1995    Date of Visit:  5/31/2024       Allergies   Allergen Reactions    Metronidazole Other (See Comments)     Tachycardia, shaking, weakness    Prochlorperazine Anxiety       Outpatient Medications Marked as Taking for the 5/31/24 encounter (Office Visit) with Marquez Ferrell,    Medication Sig Dispense Refill    norelgestromin-ethinyl estradiol (XULANE) 150-35 MCG/24HR Apply one patch each week 3 patch 5    nadolol (CORGARD) 40 MG tablet       ibuprofen (ADVIL;MOTRIN) 800 MG tablet Take 1 tablet by mouth every 8 hours as needed for Pain 60 tablet 3       Vitals:    05/31/24 0852   BP: 124/76   Weight: 82.6 kg (182 lb)   Height: 1.702 m (5' 7\")     Body mass index is 28.51 kg/m².     Wt Readings from Last 3 Encounters:   05/31/24 82.6 kg (182 lb)   09/20/23 81.9 kg (180 lb 9.6 oz)   06/16/23 81.6 kg (180 lb)     BP Readings from Last 3 Encounters:   05/31/24 124/76   09/20/23 124/76   06/13/23 124/84       Physical Exam  Constitutional:       General: She is not in acute distress.     Appearance: She is well-developed.   HENT:      Head: Normocephalic.   Neurological:      Mental Status: She is alert and oriented to person,

## 2024-07-14 SDOH — ECONOMIC STABILITY: FOOD INSECURITY: WITHIN THE PAST 12 MONTHS, THE FOOD YOU BOUGHT JUST DIDN'T LAST AND YOU DIDN'T HAVE MONEY TO GET MORE.: SOMETIMES TRUE

## 2024-07-14 SDOH — ECONOMIC STABILITY: FOOD INSECURITY: WITHIN THE PAST 12 MONTHS, YOU WORRIED THAT YOUR FOOD WOULD RUN OUT BEFORE YOU GOT MONEY TO BUY MORE.: OFTEN TRUE

## 2024-07-14 SDOH — ECONOMIC STABILITY: INCOME INSECURITY: HOW HARD IS IT FOR YOU TO PAY FOR THE VERY BASICS LIKE FOOD, HOUSING, MEDICAL CARE, AND HEATING?: HARD

## 2024-07-14 SDOH — ECONOMIC STABILITY: HOUSING INSECURITY
IN THE LAST 12 MONTHS, WAS THERE A TIME WHEN YOU DID NOT HAVE A STEADY PLACE TO SLEEP OR SLEPT IN A SHELTER (INCLUDING NOW)?: NO

## 2024-07-14 SDOH — ECONOMIC STABILITY: TRANSPORTATION INSECURITY
IN THE PAST 12 MONTHS, HAS LACK OF TRANSPORTATION KEPT YOU FROM MEETINGS, WORK, OR FROM GETTING THINGS NEEDED FOR DAILY LIVING?: NO

## 2024-07-16 ENCOUNTER — OFFICE VISIT (OUTPATIENT)
Dept: FAMILY MEDICINE CLINIC | Age: 29
End: 2024-07-16
Payer: COMMERCIAL

## 2024-07-16 VITALS
DIASTOLIC BLOOD PRESSURE: 76 MMHG | BODY MASS INDEX: 27.84 KG/M2 | HEIGHT: 67 IN | WEIGHT: 177.4 LBS | SYSTOLIC BLOOD PRESSURE: 124 MMHG

## 2024-07-16 DIAGNOSIS — E28.2 PCOS (POLYCYSTIC OVARIAN SYNDROME): Primary | ICD-10-CM

## 2024-07-16 DIAGNOSIS — F41.1 GAD (GENERALIZED ANXIETY DISORDER): ICD-10-CM

## 2024-07-16 DIAGNOSIS — N93.8 DUB (DYSFUNCTIONAL UTERINE BLEEDING): ICD-10-CM

## 2024-07-16 DIAGNOSIS — L70.0 ACNE VULGARIS: ICD-10-CM

## 2024-07-16 LAB
ALBUMIN SERPL-MCNC: 4.6 G/DL (ref 3.4–5)
ALBUMIN/GLOB SERPL: 1.8 {RATIO} (ref 1.1–2.2)
ALP SERPL-CCNC: 101 U/L (ref 40–129)
ALT SERPL-CCNC: 17 U/L (ref 10–40)
ANION GAP SERPL CALCULATED.3IONS-SCNC: 9 MMOL/L (ref 3–16)
AST SERPL-CCNC: 18 U/L (ref 15–37)
BILIRUB SERPL-MCNC: 0.4 MG/DL (ref 0–1)
BUN SERPL-MCNC: 16 MG/DL (ref 7–20)
CALCIUM SERPL-MCNC: 9.7 MG/DL (ref 8.3–10.6)
CHLORIDE SERPL-SCNC: 102 MMOL/L (ref 99–110)
CO2 SERPL-SCNC: 28 MMOL/L (ref 21–32)
CREAT SERPL-MCNC: 0.6 MG/DL (ref 0.6–1.1)
FSH SERPL-ACNC: 11.6 MIU/ML
GFR SERPLBLD CREATININE-BSD FMLA CKD-EPI: >90 ML/MIN/{1.73_M2}
GLUCOSE SERPL-MCNC: 106 MG/DL (ref 70–99)
LH SERPL-ACNC: 61 MIU/ML
POTASSIUM SERPL-SCNC: 4 MMOL/L (ref 3.5–5.1)
PROLACTIN SERPL IA-MCNC: 13.3 NG/ML
PROT SERPL-MCNC: 7.1 G/DL (ref 6.4–8.2)
SODIUM SERPL-SCNC: 139 MMOL/L (ref 136–145)
TSH SERPL DL<=0.005 MIU/L-ACNC: 1.04 UIU/ML (ref 0.27–4.2)

## 2024-07-16 PROCEDURE — 99213 OFFICE O/P EST LOW 20 MIN: CPT | Performed by: FAMILY MEDICINE

## 2024-07-16 PROCEDURE — 3078F DIAST BP <80 MM HG: CPT | Performed by: FAMILY MEDICINE

## 2024-07-16 PROCEDURE — 36415 COLL VENOUS BLD VENIPUNCTURE: CPT | Performed by: FAMILY MEDICINE

## 2024-07-16 PROCEDURE — 3074F SYST BP LT 130 MM HG: CPT | Performed by: FAMILY MEDICINE

## 2024-07-16 RX ORDER — LORAZEPAM 0.5 MG/1
0.5 TABLET ORAL EVERY 8 HOURS PRN
Qty: 60 TABLET | Refills: 0 | Status: SHIPPED | OUTPATIENT
Start: 2024-07-16 | End: 2024-08-15

## 2024-07-16 ASSESSMENT — ENCOUNTER SYMPTOMS
RESPIRATORY NEGATIVE: 1
GASTROINTESTINAL NEGATIVE: 1

## 2024-07-16 NOTE — PROGRESS NOTES
OUTPATIENT PROGRESS NOTE  Date of Service:  7/16/2024  Address: [facility]  [facility]  [address]  [address]  [place] [address]  Dept: [phone]  Loc: [phone]    Subjective:      Patient ID:  [id number]  Marcos Rosales is a 29 y.o. female     HPI  Dub    She is off her birth control for three months now  Periods are heavy and off  have always been  Getting acne  Wants to have her hormones evaluated  Also needs refill on her ativan     Review of Systems   Constitutional: Negative.    HENT: Negative.     Respiratory: Negative.     Cardiovascular: Negative.    Gastrointestinal: Negative.    Genitourinary:  Positive for menstrual problem and vaginal bleeding.   Neurological: Negative.    Psychiatric/Behavioral:  The patient is nervous/anxious.        Objective:   YOB: 1995    Date of Visit:  7/16/2024       Allergies   Allergen Reactions    Metronidazole Other (See Comments)     Tachycardia, shaking, weakness    Prochlorperazine Anxiety       Outpatient Medications Marked as Taking for the 7/16/24 encounter (Office Visit) with Marquez Ferrell, DO   Medication Sig Dispense Refill    nadolol (CORGARD) 40 MG tablet       ibuprofen (ADVIL;MOTRIN) 800 MG tablet Take 1 tablet by mouth every 8 hours as needed for Pain 60 tablet 3       Vitals:    07/16/24 0656   BP: 124/76   Weight: 80.5 kg (177 lb 6.4 oz)   Height: 1.702 m (5' 7\")     Body mass index is 27.78 kg/m².     Wt Readings from Last 3 Encounters:   07/16/24 80.5 kg (177 lb 6.4 oz)   05/31/24 82.6 kg (182 lb)   09/20/23 81.9 kg (180 lb 9.6 oz)     BP Readings from Last 3 Encounters:   07/16/24 124/76   05/31/24 124/76   09/20/23 124/76       Physical Exam  Constitutional:       General: She is not in acute distress.     Appearance: She is well-developed.   HENT:      Head: Normocephalic.   Neurological:      Mental Status: She is alert and oriented to person, place, and time.

## 2024-07-18 LAB
DHEA-S SERPL-MCNC: 322 UG/DL (ref 98.8–340)
SHBG SERPL-SCNC: 37 NMOL/L (ref 25–122)
TESTOST FREE SERPL-MCNC: 15 PG/ML (ref 0.8–7.4)
TESTOST SERPL-MCNC: 88 NG/DL (ref 8–48)

## 2024-07-21 LAB — 17OHP SERPL-MCNC: 222.88 NG/DL

## 2024-08-12 RX ORDER — IBUPROFEN 800 MG/1
800 TABLET ORAL EVERY 8 HOURS PRN
Qty: 60 TABLET | Refills: 3 | Status: SHIPPED | OUTPATIENT
Start: 2024-08-12

## 2024-08-12 NOTE — TELEPHONE ENCOUNTER
Last office visit 7/16/2024       Next office visit scheduled Visit date not found    Requested Prescriptions     Pending Prescriptions Disp Refills    ibuprofen (ADVIL;MOTRIN) 800 MG tablet 60 tablet 3     Sig: Take 1 tablet by mouth every 8 hours as needed for Pain

## 2024-09-20 ENCOUNTER — APPOINTMENT (OUTPATIENT)
Dept: GENERAL RADIOLOGY | Age: 29
End: 2024-09-20
Payer: COMMERCIAL

## 2024-09-20 ENCOUNTER — HOSPITAL ENCOUNTER (EMERGENCY)
Age: 29
Discharge: HOME | End: 2024-09-20
Attending: EMERGENCY MEDICINE
Payer: COMMERCIAL

## 2024-09-20 VITALS
HEART RATE: 82 BPM | BODY MASS INDEX: 27.65 KG/M2 | WEIGHT: 176.2 LBS | SYSTOLIC BLOOD PRESSURE: 134 MMHG | DIASTOLIC BLOOD PRESSURE: 84 MMHG | TEMPERATURE: 97.9 F | HEIGHT: 67 IN | RESPIRATION RATE: 16 BRPM | OXYGEN SATURATION: 98 %

## 2024-09-20 DIAGNOSIS — J06.9 ACUTE UPPER RESPIRATORY INFECTION: Primary | ICD-10-CM

## 2024-09-20 PROCEDURE — 71046 X-RAY EXAM CHEST 2 VIEWS: CPT

## 2024-09-20 PROCEDURE — 99283 EMERGENCY DEPT VISIT LOW MDM: CPT

## 2024-09-20 RX ORDER — ALBUTEROL SULFATE 90 UG/1
2 INHALANT RESPIRATORY (INHALATION) 4 TIMES DAILY PRN
Qty: 18 G | Refills: 0 | Status: SHIPPED | OUTPATIENT
Start: 2024-09-20

## 2024-09-20 RX ORDER — GUAIFENESIN AND DEXTROMETHORPHAN HYDROBROMIDE 600; 30 MG/1; MG/1
1 TABLET, EXTENDED RELEASE ORAL 2 TIMES DAILY
Qty: 14 TABLET | Refills: 0 | Status: SHIPPED | OUTPATIENT
Start: 2024-09-20

## 2024-09-20 RX ORDER — BENZONATATE 200 MG/1
200 CAPSULE ORAL 3 TIMES DAILY PRN
Qty: 30 CAPSULE | Refills: 0 | Status: SHIPPED | OUTPATIENT
Start: 2024-09-20 | End: 2024-09-27

## 2024-09-20 ASSESSMENT — PAIN SCALES - GENERAL: PAINLEVEL_OUTOF10: 0

## 2024-09-20 ASSESSMENT — PAIN - FUNCTIONAL ASSESSMENT
PAIN_FUNCTIONAL_ASSESSMENT: 0-10
PAIN_FUNCTIONAL_ASSESSMENT: 0-10

## 2024-09-20 ASSESSMENT — PAIN DESCRIPTION - PAIN TYPE: TYPE: ACUTE PAIN

## 2024-09-20 ASSESSMENT — PAIN DESCRIPTION - LOCATION: LOCATION: CHEST;BACK

## 2024-09-20 ASSESSMENT — PAIN DESCRIPTION - DESCRIPTORS: DESCRIPTORS: ACHING

## 2024-09-20 ASSESSMENT — PAIN DESCRIPTION - FREQUENCY: FREQUENCY: CONTINUOUS

## 2025-05-30 ENCOUNTER — OFFICE VISIT (OUTPATIENT)
Dept: FAMILY MEDICINE CLINIC | Age: 30
End: 2025-05-30

## 2025-05-30 VITALS
WEIGHT: 169.6 LBS | BODY MASS INDEX: 26.62 KG/M2 | DIASTOLIC BLOOD PRESSURE: 82 MMHG | SYSTOLIC BLOOD PRESSURE: 130 MMHG | HEIGHT: 67 IN

## 2025-05-30 DIAGNOSIS — Z01.419 WOMEN'S ANNUAL ROUTINE GYNECOLOGICAL EXAMINATION: Primary | ICD-10-CM

## 2025-05-30 SDOH — ECONOMIC STABILITY: FOOD INSECURITY: WITHIN THE PAST 12 MONTHS, YOU WORRIED THAT YOUR FOOD WOULD RUN OUT BEFORE YOU GOT MONEY TO BUY MORE.: NEVER TRUE

## 2025-05-30 SDOH — ECONOMIC STABILITY: FOOD INSECURITY: WITHIN THE PAST 12 MONTHS, THE FOOD YOU BOUGHT JUST DIDN'T LAST AND YOU DIDN'T HAVE MONEY TO GET MORE.: NEVER TRUE

## 2025-05-30 ASSESSMENT — ENCOUNTER SYMPTOMS
RESPIRATORY NEGATIVE: 1
GASTROINTESTINAL NEGATIVE: 1

## 2025-05-30 NOTE — PROGRESS NOTES
OUTPATIENT PROGRESS NOTE  Date of Service:  2025  Address: [facility]  [facility]  [address]  [address]  [place] [address]  Dept: [phone]  Loc: [phone]    Subjective:      Patient ID:  [id number]  Marcos Rosales is a 30 y.o. female     Gynecologic Exam  The patient's primary symptoms include vaginal discharge.     Well gyn  Here for pap   Also wants std checkup and checked for bv  Some discharge  Has been under some stress  her dad  suddenly in        Review of Systems   Constitutional: Negative.    Respiratory: Negative.     Cardiovascular: Negative.    Gastrointestinal: Negative.    Genitourinary:  Positive for vaginal discharge.   Skin: Negative.    Neurological: Negative.        Objective:   YOB: 1995    Date of Visit:  2025       Allergies   Allergen Reactions    Metronidazole Other (See Comments)     Tachycardia, shaking, weakness    Prochlorperazine Anxiety    Pseudoephedrine-Dm-Gg Anxiety, Dizziness or Vertigo and Palpitations       Outpatient Medications Marked as Taking for the 25 encounter (Office Visit) with Marquez Ferrell,    Medication Sig Dispense Refill    Dextromethorphan-guaiFENesin (MUCINEX DM)  MG TB12 Take 1 tablet by mouth 2 times daily 14 tablet 0    albuterol sulfate HFA (VENTOLIN HFA) 108 (90 Base) MCG/ACT inhaler Inhale 2 puffs into the lungs 4 times daily as needed for Wheezing 18 g 0    nadolol (CORGARD) 40 MG tablet          Vitals:    25 0939   BP: 130/82   Weight: 76.9 kg (169 lb 9.6 oz)   Height: 1.702 m (5' 7\")     Body mass index is 26.56 kg/m².     Wt Readings from Last 3 Encounters:   25 76.9 kg (169 lb 9.6 oz)   24 79.9 kg (176 lb 3.2 oz)   24 80.5 kg (177 lb 6.4 oz)     BP Readings from Last 3 Encounters:   25 130/82   24 134/84   24 124/76       Physical Exam  Vitals and nursing note reviewed.   Constitutional:

## 2025-05-31 ENCOUNTER — RESULTS FOLLOW-UP (OUTPATIENT)
Dept: FAMILY MEDICINE CLINIC | Age: 30
End: 2025-05-31

## 2025-05-31 DIAGNOSIS — B96.89 BACTERIAL VAGINOSIS: Primary | ICD-10-CM

## 2025-05-31 DIAGNOSIS — N76.0 BACTERIAL VAGINOSIS: Primary | ICD-10-CM

## 2025-05-31 LAB
C TRACH DNA SPEC QL NAA+PROBE: NOT DETECTED
CANDIDA RRNA VAG QL PROBE: DETECTED
CANDIDA RRNA VAG QL PROBE: NOT DETECTED
CARBAPENEM RESISTANCE OXA-48 GENE BY PCR: NOT DETECTED
CEPHALOSPORIN RESISTANCE AMPC GENE: NOT DETECTED
ESBL RESISTANCE: NOT DETECTED
G VAGINALIS RRNA GENITAL QL PROBE: DETECTED
M HOMINIS DNA BLD QL NAA+PROBE: DETECTED
MACROLIDE RESISTANCE: NOT DETECTED
METHICILLIN RESISTANCE: NOT DETECTED
MYCOPLASMA DNA SPEC QL NAA+PROBE: NOT DETECTED
N GONORRHOEA DNA SPEC QL NAA+PROBE: NOT DETECTED
OTHER MICROORG DNA SPEC QL NAA+PROBE: NOT DETECTED
OTHER MICROORG DNA SPEC QL NAA+PROBE: NOT DETECTED
QUINOLONE AND FLUOROQUINOLONE RESISTANCE: NOT DETECTED
T VAGINALIS RRNA SPEC QL NAA+PROBE: NOT DETECTED
TETRACYCLINE RESISTANCE: NOT DETECTED
TRIMETHOPRIM/SULFONAMIDE RESISTANCE: NOT DETECTED

## 2025-05-31 RX ORDER — CLINDAMYCIN PHOSPHATE 20 MG/G
CREAM VAGINAL
Qty: 40 G | Refills: 0 | Status: SHIPPED | OUTPATIENT
Start: 2025-05-31 | End: 2025-06-04 | Stop reason: SDUPTHER

## 2025-05-31 RX ORDER — FLUCONAZOLE 150 MG/1
150 TABLET ORAL ONCE
Qty: 1 TABLET | Refills: 0 | Status: SHIPPED | OUTPATIENT
Start: 2025-05-31 | End: 2025-06-04 | Stop reason: SDUPTHER

## 2025-06-04 DIAGNOSIS — B96.89 BACTERIAL VAGINOSIS: ICD-10-CM

## 2025-06-04 DIAGNOSIS — N76.0 BACTERIAL VAGINOSIS: ICD-10-CM

## 2025-06-04 RX ORDER — CLINDAMYCIN PHOSPHATE 20 MG/G
CREAM VAGINAL
Qty: 40 G | Refills: 0 | Status: SHIPPED | OUTPATIENT
Start: 2025-06-04 | End: 2025-06-11

## 2025-06-04 RX ORDER — FLUCONAZOLE 150 MG/1
150 TABLET ORAL ONCE
Qty: 1 TABLET | Refills: 0 | Status: SHIPPED | OUTPATIENT
Start: 2025-06-04 | End: 2025-06-04

## 2025-06-05 LAB — HPV16+18+H RISK 12 DNA SPEC-IMP: NORMAL

## 2025-06-09 ENCOUNTER — PATIENT MESSAGE (OUTPATIENT)
Dept: FAMILY MEDICINE CLINIC | Age: 30
End: 2025-06-09

## 2025-06-09 DIAGNOSIS — R87.612 LOW GRADE SQUAMOUS INTRAEPITHELIAL LESION ON CYTOLOGIC SMEAR OF CERVIX (LGSIL): Primary | ICD-10-CM

## 2025-06-09 LAB
HPV HR 12 DNA SPEC QL NAA+PROBE: DETECTED
HPV16 DNA SPEC QL NAA+PROBE: NOT DETECTED
HPV16+18+H RISK 12 DNA SPEC-IMP: ABNORMAL
HPV18 DNA SPEC QL NAA+PROBE: DETECTED

## 2025-06-11 ENCOUNTER — OFFICE VISIT (OUTPATIENT)
Dept: GYNECOLOGY | Age: 30
End: 2025-06-11

## 2025-06-11 VITALS — BODY MASS INDEX: 26.22 KG/M2 | SYSTOLIC BLOOD PRESSURE: 124 MMHG | DIASTOLIC BLOOD PRESSURE: 76 MMHG | WEIGHT: 167.4 LBS

## 2025-06-11 DIAGNOSIS — R87.612 LGSIL ON PAP SMEAR OF CERVIX: Primary | ICD-10-CM

## 2025-06-11 DIAGNOSIS — R87.810 CERVICAL HIGH RISK HPV (HUMAN PAPILLOMAVIRUS) TEST POSITIVE: ICD-10-CM

## 2025-06-11 NOTE — PROGRESS NOTES
Colposcopy Procedure Note    Indications: Pap smear 1 months ago showed: low-grade squamous intraepithelial neoplasia (LGSIL - encompassing HPV,mild dysplasia,TETE I). The prior pap showed no abnormalities.  Prior cervical/vaginal disease: normal exam without visible pathology. Prior cervical treatment: no treatment.    Procedure Details   The risks and benefits of the procedure and Written informed consent obtained.    Speculum placed in vagina and excellent visualization of cervix achieved, cervix swabbed x 3 with acetic acid solution.    Findings:  Cervix: acetowhite lesion(s) noted at 12 o'clock; SCJ visualized - lesion at 12 o'clock, endocervical curettage performed, cervical biopsies taken at 12 o'clock, specimen labelled and sent to pathology, and hemostasis achieved with Monsel's solution.  Vaginal inspection: normal without visible lesions.  Vulvar colposcopy: normal mucosa without lesions.    Specimens: 1200, ecc    Complications: none.    Plan:  Specimens labelled and sent to Pathology.  Will base further treatment on Pathology findings.  Treatment options discussed with patient.  
2018

## 2025-06-16 ENCOUNTER — RESULTS FOLLOW-UP (OUTPATIENT)
Dept: GYNECOLOGY | Age: 30
End: 2025-06-16